# Patient Record
Sex: FEMALE | Race: BLACK OR AFRICAN AMERICAN | NOT HISPANIC OR LATINO
[De-identification: names, ages, dates, MRNs, and addresses within clinical notes are randomized per-mention and may not be internally consistent; named-entity substitution may affect disease eponyms.]

---

## 2017-02-27 ENCOUNTER — APPOINTMENT (OUTPATIENT)
Dept: ANTEPARTUM | Facility: CLINIC | Age: 37
End: 2017-02-27

## 2017-02-27 ENCOUNTER — ASOB RESULT (OUTPATIENT)
Age: 37
End: 2017-02-27

## 2017-04-10 ENCOUNTER — ASOB RESULT (OUTPATIENT)
Age: 37
End: 2017-04-10

## 2017-04-10 ENCOUNTER — APPOINTMENT (OUTPATIENT)
Dept: ANTEPARTUM | Facility: CLINIC | Age: 37
End: 2017-04-10

## 2017-08-05 ENCOUNTER — OUTPATIENT (OUTPATIENT)
Dept: OUTPATIENT SERVICES | Facility: HOSPITAL | Age: 37
LOS: 1 days | End: 2017-08-05

## 2017-08-05 DIAGNOSIS — O26.899 OTHER SPECIFIED PREGNANCY RELATED CONDITIONS, UNSPECIFIED TRIMESTER: ICD-10-CM

## 2017-08-05 DIAGNOSIS — Z3A.00 WEEKS OF GESTATION OF PREGNANCY NOT SPECIFIED: ICD-10-CM

## 2017-09-01 ENCOUNTER — OUTPATIENT (OUTPATIENT)
Dept: OUTPATIENT SERVICES | Facility: HOSPITAL | Age: 37
LOS: 1 days | End: 2017-09-01

## 2017-09-01 DIAGNOSIS — Z3A.00 WEEKS OF GESTATION OF PREGNANCY NOT SPECIFIED: ICD-10-CM

## 2017-09-01 DIAGNOSIS — O26.899 OTHER SPECIFIED PREGNANCY RELATED CONDITIONS, UNSPECIFIED TRIMESTER: ICD-10-CM

## 2017-09-02 ENCOUNTER — INPATIENT (INPATIENT)
Facility: HOSPITAL | Age: 37
LOS: 1 days | Discharge: HOME | End: 2017-09-04
Attending: OBSTETRICS & GYNECOLOGY | Admitting: OBSTETRICS & GYNECOLOGY

## 2017-09-02 ENCOUNTER — TRANSCRIPTION ENCOUNTER (OUTPATIENT)
Age: 37
End: 2017-09-02

## 2017-09-02 VITALS — DIASTOLIC BLOOD PRESSURE: 63 MMHG | SYSTOLIC BLOOD PRESSURE: 130 MMHG | HEART RATE: 101 BPM | TEMPERATURE: 98 F

## 2017-09-02 DIAGNOSIS — O26.899 OTHER SPECIFIED PREGNANCY RELATED CONDITIONS, UNSPECIFIED TRIMESTER: ICD-10-CM

## 2017-09-02 DIAGNOSIS — Z3A.00 WEEKS OF GESTATION OF PREGNANCY NOT SPECIFIED: ICD-10-CM

## 2017-09-02 LAB
BASOPHILS # BLD AUTO: 0.03 K/UL — SIGNIFICANT CHANGE UP (ref 0–0.2)
BASOPHILS NFR BLD AUTO: 0.2 % — SIGNIFICANT CHANGE UP (ref 0–2)
BLD GP AB SCN SERPL QL: NEGATIVE — SIGNIFICANT CHANGE UP
EOSINOPHIL # BLD AUTO: 0.14 K/UL — SIGNIFICANT CHANGE UP (ref 0–0.5)
EOSINOPHIL NFR BLD AUTO: 1 % — SIGNIFICANT CHANGE UP (ref 0–6)
HCT VFR BLD CALC: 41.6 % — SIGNIFICANT CHANGE UP (ref 34.5–45)
HGB BLD-MCNC: 14.4 G/DL — SIGNIFICANT CHANGE UP (ref 11.5–15.5)
IMM GRANULOCYTES # BLD AUTO: 0.07 # — SIGNIFICANT CHANGE UP
IMM GRANULOCYTES NFR BLD AUTO: 0.5 % — SIGNIFICANT CHANGE UP (ref 0–1.5)
LYMPHOCYTES # BLD AUTO: 24.2 % — SIGNIFICANT CHANGE UP (ref 13–44)
LYMPHOCYTES # BLD AUTO: 3.27 K/UL — SIGNIFICANT CHANGE UP (ref 1–3.3)
MCHC RBC-ENTMCNC: 30.8 PG — SIGNIFICANT CHANGE UP (ref 27–34)
MCHC RBC-ENTMCNC: 34.6 % — SIGNIFICANT CHANGE UP (ref 32–36)
MCV RBC AUTO: 88.9 FL — SIGNIFICANT CHANGE UP (ref 80–100)
MONOCYTES # BLD AUTO: 0.95 K/UL — HIGH (ref 0–0.9)
MONOCYTES NFR BLD AUTO: 7 % — SIGNIFICANT CHANGE UP (ref 2–14)
NEUTROPHILS # BLD AUTO: 9.03 K/UL — HIGH (ref 1.8–7.4)
NEUTROPHILS NFR BLD AUTO: 67.1 % — SIGNIFICANT CHANGE UP (ref 43–77)
NRBC # FLD: 0 — SIGNIFICANT CHANGE UP
PLATELET # BLD AUTO: 249 K/UL — SIGNIFICANT CHANGE UP (ref 150–400)
PMV BLD: 11.8 FL — SIGNIFICANT CHANGE UP (ref 7–13)
RBC # BLD: 4.68 M/UL — SIGNIFICANT CHANGE UP (ref 3.8–5.2)
RBC # FLD: 13.8 % — SIGNIFICANT CHANGE UP (ref 10.3–14.5)
RH IG SCN BLD-IMP: POSITIVE — SIGNIFICANT CHANGE UP
RH IG SCN BLD-IMP: POSITIVE — SIGNIFICANT CHANGE UP
T PALLIDUM AB TITR SER: NEGATIVE — SIGNIFICANT CHANGE UP
WBC # BLD: 13.49 K/UL — HIGH (ref 3.8–10.5)
WBC # FLD AUTO: 13.49 K/UL — HIGH (ref 3.8–10.5)

## 2017-09-02 RX ORDER — DIPHENHYDRAMINE HCL 50 MG
25 CAPSULE ORAL EVERY 6 HOURS
Qty: 0 | Refills: 0 | Status: DISCONTINUED | OUTPATIENT
Start: 2017-09-02 | End: 2017-09-04

## 2017-09-02 RX ORDER — TETANUS TOXOID, REDUCED DIPHTHERIA TOXOID AND ACELLULAR PERTUSSIS VACCINE, ADSORBED 5; 2.5; 8; 8; 2.5 [IU]/.5ML; [IU]/.5ML; UG/.5ML; UG/.5ML; UG/.5ML
0.5 SUSPENSION INTRAMUSCULAR ONCE
Qty: 0 | Refills: 0 | Status: DISCONTINUED | OUTPATIENT
Start: 2017-09-02 | End: 2017-09-04

## 2017-09-02 RX ORDER — IBUPROFEN 200 MG
600 TABLET ORAL EVERY 6 HOURS
Qty: 0 | Refills: 0 | Status: DISCONTINUED | OUTPATIENT
Start: 2017-09-02 | End: 2017-09-04

## 2017-09-02 RX ORDER — SIMETHICONE 80 MG/1
80 TABLET, CHEWABLE ORAL EVERY 6 HOURS
Qty: 0 | Refills: 0 | Status: DISCONTINUED | OUTPATIENT
Start: 2017-09-02 | End: 2017-09-04

## 2017-09-02 RX ORDER — SODIUM CHLORIDE 9 MG/ML
3 INJECTION INTRAMUSCULAR; INTRAVENOUS; SUBCUTANEOUS EVERY 8 HOURS
Qty: 0 | Refills: 0 | Status: DISCONTINUED | OUTPATIENT
Start: 2017-09-02 | End: 2017-09-02

## 2017-09-02 RX ORDER — AER TRAVELER 0.5 G/1
1 SOLUTION RECTAL; TOPICAL EVERY 4 HOURS
Qty: 0 | Refills: 0 | Status: DISCONTINUED | OUTPATIENT
Start: 2017-09-02 | End: 2017-09-02

## 2017-09-02 RX ORDER — PRAMOXINE HYDROCHLORIDE 150 MG/15G
1 AEROSOL, FOAM RECTAL EVERY 4 HOURS
Qty: 0 | Refills: 0 | Status: DISCONTINUED | OUTPATIENT
Start: 2017-09-02 | End: 2017-09-02

## 2017-09-02 RX ORDER — OXYTOCIN 10 UNIT/ML
333.33 VIAL (ML) INJECTION
Qty: 20 | Refills: 0 | Status: COMPLETED | OUTPATIENT
Start: 2017-09-02

## 2017-09-02 RX ORDER — SODIUM CHLORIDE 9 MG/ML
1000 INJECTION, SOLUTION INTRAVENOUS ONCE
Qty: 0 | Refills: 0 | Status: DISCONTINUED | OUTPATIENT
Start: 2017-09-02 | End: 2017-09-02

## 2017-09-02 RX ORDER — LANOLIN
1 OINTMENT (GRAM) TOPICAL EVERY 6 HOURS
Qty: 0 | Refills: 0 | Status: DISCONTINUED | OUTPATIENT
Start: 2017-09-02 | End: 2017-09-04

## 2017-09-02 RX ORDER — GLYCERIN ADULT
1 SUPPOSITORY, RECTAL RECTAL AT BEDTIME
Qty: 0 | Refills: 0 | Status: DISCONTINUED | OUTPATIENT
Start: 2017-09-02 | End: 2017-09-04

## 2017-09-02 RX ORDER — ACETAMINOPHEN 500 MG
975 TABLET ORAL EVERY 6 HOURS
Qty: 0 | Refills: 0 | Status: COMPLETED | OUTPATIENT
Start: 2017-09-02 | End: 2018-08-01

## 2017-09-02 RX ORDER — DOCUSATE SODIUM 100 MG
100 CAPSULE ORAL
Qty: 0 | Refills: 0 | Status: DISCONTINUED | OUTPATIENT
Start: 2017-09-02 | End: 2017-09-04

## 2017-09-02 RX ORDER — OXYCODONE HYDROCHLORIDE 5 MG/1
5 TABLET ORAL
Qty: 0 | Refills: 0 | Status: DISCONTINUED | OUTPATIENT
Start: 2017-09-02 | End: 2017-09-04

## 2017-09-02 RX ORDER — MAGNESIUM HYDROXIDE 400 MG/1
30 TABLET, CHEWABLE ORAL
Qty: 0 | Refills: 0 | Status: DISCONTINUED | OUTPATIENT
Start: 2017-09-02 | End: 2017-09-04

## 2017-09-02 RX ORDER — OXYTOCIN 10 UNIT/ML
333.33 VIAL (ML) INJECTION
Qty: 20 | Refills: 0 | Status: COMPLETED | OUTPATIENT
Start: 2017-09-02 | End: 2017-09-02

## 2017-09-02 RX ORDER — DIBUCAINE 1 %
1 OINTMENT (GRAM) RECTAL EVERY 4 HOURS
Qty: 0 | Refills: 0 | Status: DISCONTINUED | OUTPATIENT
Start: 2017-09-02 | End: 2017-09-04

## 2017-09-02 RX ORDER — IBUPROFEN 200 MG
600 TABLET ORAL EVERY 6 HOURS
Qty: 0 | Refills: 0 | Status: COMPLETED | OUTPATIENT
Start: 2017-09-02 | End: 2018-08-01

## 2017-09-02 RX ORDER — ACETAMINOPHEN 500 MG
975 TABLET ORAL EVERY 6 HOURS
Qty: 0 | Refills: 0 | Status: DISCONTINUED | OUTPATIENT
Start: 2017-09-02 | End: 2017-09-04

## 2017-09-02 RX ORDER — SODIUM CHLORIDE 9 MG/ML
3 INJECTION INTRAMUSCULAR; INTRAVENOUS; SUBCUTANEOUS EVERY 8 HOURS
Qty: 0 | Refills: 0 | Status: DISCONTINUED | OUTPATIENT
Start: 2017-09-02 | End: 2017-09-04

## 2017-09-02 RX ORDER — HYDROCORTISONE 1 %
1 OINTMENT (GRAM) TOPICAL EVERY 4 HOURS
Qty: 0 | Refills: 0 | Status: DISCONTINUED | OUTPATIENT
Start: 2017-09-02 | End: 2017-09-03

## 2017-09-02 RX ORDER — SODIUM CHLORIDE 9 MG/ML
1000 INJECTION, SOLUTION INTRAVENOUS
Qty: 0 | Refills: 0 | Status: DISCONTINUED | OUTPATIENT
Start: 2017-09-02 | End: 2017-09-02

## 2017-09-02 RX ORDER — AER TRAVELER 0.5 G/1
1 SOLUTION RECTAL; TOPICAL EVERY 4 HOURS
Qty: 0 | Refills: 0 | Status: DISCONTINUED | OUTPATIENT
Start: 2017-09-02 | End: 2017-09-04

## 2017-09-02 RX ORDER — OXYTOCIN 10 UNIT/ML
41.67 VIAL (ML) INJECTION
Qty: 20 | Refills: 0 | Status: DISCONTINUED | OUTPATIENT
Start: 2017-09-02 | End: 2017-09-02

## 2017-09-02 RX ORDER — OXYTOCIN 10 UNIT/ML
41.67 VIAL (ML) INJECTION
Qty: 20 | Refills: 0 | Status: DISCONTINUED | OUTPATIENT
Start: 2017-09-02 | End: 2017-09-03

## 2017-09-02 RX ORDER — KETOROLAC TROMETHAMINE 30 MG/ML
30 SYRINGE (ML) INJECTION ONCE
Qty: 0 | Refills: 0 | Status: DISCONTINUED | OUTPATIENT
Start: 2017-09-02 | End: 2017-09-02

## 2017-09-02 RX ORDER — HYDROCORTISONE 1 %
1 OINTMENT (GRAM) TOPICAL EVERY 4 HOURS
Qty: 0 | Refills: 0 | Status: DISCONTINUED | OUTPATIENT
Start: 2017-09-02 | End: 2017-09-02

## 2017-09-02 RX ORDER — DIBUCAINE 1 %
1 OINTMENT (GRAM) RECTAL EVERY 4 HOURS
Qty: 0 | Refills: 0 | Status: DISCONTINUED | OUTPATIENT
Start: 2017-09-02 | End: 2017-09-02

## 2017-09-02 RX ORDER — PRAMOXINE HYDROCHLORIDE 150 MG/15G
1 AEROSOL, FOAM RECTAL EVERY 4 HOURS
Qty: 0 | Refills: 0 | Status: DISCONTINUED | OUTPATIENT
Start: 2017-09-02 | End: 2017-09-03

## 2017-09-02 RX ADMIN — Medication 600 MILLIGRAM(S): at 21:46

## 2017-09-02 RX ADMIN — Medication 1 TABLET(S): at 13:38

## 2017-09-02 RX ADMIN — SODIUM CHLORIDE 3 MILLILITER(S): 9 INJECTION INTRAMUSCULAR; INTRAVENOUS; SUBCUTANEOUS at 22:09

## 2017-09-02 RX ADMIN — Medication 1000 MILLIUNIT(S)/MIN: at 10:28

## 2017-09-02 RX ADMIN — SODIUM CHLORIDE 3 MILLILITER(S): 9 INJECTION INTRAMUSCULAR; INTRAVENOUS; SUBCUTANEOUS at 14:42

## 2017-09-02 RX ADMIN — Medication 30 MILLIGRAM(S): at 07:35

## 2017-09-02 RX ADMIN — Medication 125 MILLIUNIT(S)/MIN: at 10:27

## 2017-09-02 RX ADMIN — Medication 30 MILLIGRAM(S): at 08:05

## 2017-09-02 RX ADMIN — SODIUM CHLORIDE 125 MILLILITER(S): 9 INJECTION, SOLUTION INTRAVENOUS at 10:25

## 2017-09-02 RX ADMIN — Medication 600 MILLIGRAM(S): at 22:20

## 2017-09-02 NOTE — DISCHARGE NOTE OB - PATIENT PORTAL LINK FT
“You can access the [employer] Patient Portal, offered by [facility], by registering with the following website: [email or web address]”

## 2017-09-02 NOTE — DISCHARGE NOTE OB - CARE PROVIDER_API CALL
Titus Muller), Obstetrics and Gynecology  [address] [place] [address]  Phone: [phone]  Fax: [phone]

## 2017-09-02 NOTE — DISCHARGE NOTE OB - CARE PLAN
Principal Discharge DX:	Delivery normal  Goal:	Recovery  Instructions for follow-up, activity and diet:	Regular diet  Secondary Diagnosis:	Precipitous delivery

## 2017-09-03 RX ORDER — HYDROCORTISONE 1 %
1 OINTMENT (GRAM) TOPICAL EVERY 4 HOURS
Qty: 0 | Refills: 0 | Status: DISCONTINUED | OUTPATIENT
Start: 2017-09-03 | End: 2017-09-04

## 2017-09-03 RX ORDER — PRAMOXINE HYDROCHLORIDE 150 MG/15G
1 AEROSOL, FOAM RECTAL EVERY 4 HOURS
Qty: 0 | Refills: 0 | Status: DISCONTINUED | OUTPATIENT
Start: 2017-09-03 | End: 2017-09-04

## 2017-09-03 RX ADMIN — Medication 600 MILLIGRAM(S): at 18:15

## 2017-09-03 RX ADMIN — Medication 600 MILLIGRAM(S): at 19:15

## 2017-09-03 RX ADMIN — SODIUM CHLORIDE 3 MILLILITER(S): 9 INJECTION INTRAMUSCULAR; INTRAVENOUS; SUBCUTANEOUS at 07:09

## 2017-09-03 RX ADMIN — Medication 1 TABLET(S): at 12:08

## 2017-09-03 NOTE — PROGRESS NOTE ADULT - SUBJECTIVE AND OBJECTIVE BOX
Patient assessed at 1100.    Subjective  Pain: Patient denies any pain at the time of assessment. Pain being managed well by pain management protocol  Complaints:  None  Milestones: Alert and orientedx3. Out of bed ambulating. Voiding. Tolerating a regular diet.  Infant feeding: Breastfeeding                          Feeding related issues or concerns: none    Objective   Vital Signs:  ICU Vital Signs Last 24 Hrs  T(C): 36.6 (03 Sep 2017 05:24), Max: 37.3 (02 Sep 2017 18:00)  T(F): 97.9 (03 Sep 2017 05:24), Max: 99.1 (02 Sep 2017 18:00)  HR: 109 (03 Sep 2017 05:24) (87 - 109)  BP: 113/64 (03 Sep 2017 05:24) (113/64 - 120/70)  BP(mean): --  ABP: --  ABP(mean): --  RR: 18 (03 Sep 2017 05:24) (16 - 18)  SpO2: 100% (03 Sep 2017 05:24) (98% - 100%)    Labs:                        14.4   13.49 )-----------( 249      ( 02 Sep 2017 07:16 )             41.6     Blood Type: Opositive  Rubella: Immune  RPR: nonreactive    Assessment  36y/o . Day #1  postpartum  on 17 @ 0725. Precipitous delivery  Past medical history significant for non-hodgkins lymphoma  Current Issues: None   Breasts: soft, nontender  Nipples: intact  Abdomen: Soft, nontender, nondistended. Fundus firm. Positive bowel sounds  Vagina: Lochia light rubra  Perineum:  WNL, no edema noted  Laceration/episiotomy site: periurethral laceration and 1degree laceration WNL, no edema noted  Lower extremities: No edema noted bilaterally of lower extremities. Nontender calves.  Negative Mikki's sign. Positive pedal pulses.  Other relevant physical exam findings:      Plan  Plan: Increase ambulation, analgesia PRN and pain medication protocol standing oxycodone, ibuprofen and acetaminophen.  Diet: Continue regular diet  Labs: None    Continue routine postpartum care.

## 2017-09-03 NOTE — PROGRESS NOTE ADULT - SUBJECTIVE AND OBJECTIVE BOX
Post-partum Note,   She is a  37y woman who is now post-partum day: 1    Subjective:  Patient seen and examined at bedside. She feels well and is without complaints. Voiding/Ambulating without difficulty. Patient is tolerating regular diet. She reports normal postpartum bleeding. Bottle/Breast feeding.     Vital Signs Last 24 Hrs  T(C): 36.6 (03 Sep 2017 05:24), Max: 37.6 (02 Sep 2017 11:18)  T(F): 97.9 (03 Sep 2017 05:24), Max: 99.7 (02 Sep 2017 11:18)  HR: 109 (03 Sep 2017 05:24) (76 - 109)  BP: 113/64 (03 Sep 2017 05:24) (113/64 - 132/77)  BP(mean): --  RR: 18 (03 Sep 2017 05:24) (16 - 18)  SpO2: 100% (03 Sep 2017 05:24) (98% - 100%)    Physical Exam:   Gen: NAD  Abdomen: Soft, nontender, no distension , firm uterine fundus at umbilicus.  Pelvic: Normal lochia noted  Ext: No calf tenderness    LABS:                        14.4   13.49 )-----------( 249      ( 02 Sep 2017 07:16 )             41.6       MEDICATIONS  (STANDING):  sodium chloride 0.9% lock flush 3 milliLiter(s) IV Push every 8 hours  diphtheria/tetanus/pertussis (acellular) Vaccine (ADAcel) 0.5 milliLiter(s) IntraMuscular once  prenatal multivitamin 1 Tablet(s) Oral daily  oxyCODONE    IR 5 milliGRAM(s) Oral every 3 hours  acetaminophen   Tablet. 975 milliGRAM(s) Oral every 6 hours  ibuprofen  Tablet 600 milliGRAM(s) Oral every 6 hours    MEDICATIONS  (PRN):  dibucaine 1% Ointment 1 Application(s) Topical every 4 hours PRN Perineal Discomfort  lanolin Ointment 1 Application(s) Topical every 6 hours PRN Sore Nipples  witch hazel Pads 1 Application(s) Topical every 4 hours PRN Perineal Discomfort  simethicone 80 milliGRAM(s) Chew every 6 hours PRN Gas  diphenhydrAMINE   Capsule 25 milliGRAM(s) Oral every 6 hours PRN Itching  glycerin Suppository - Adult 1 Suppository(s) Rectal at bedtime PRN Constipation  magnesium hydroxide Suspension 30 milliLiter(s) Oral two times a day PRN Constipation  docusate sodium 100 milliGRAM(s) Oral two times a day PRN Stool Softening  pramoxine 1%/zinc 5% Cream 1 Application(s) Topical every 4 hours PRN perineal discomfort  hydrocortisone 1% Cream 1 Application(s) Topical every 4 hours PRN perineal discomfort        Assessment and Plan  s/p , PPD#1  stable and afebrile   Encourage ambulation  PP & PPD Instructions reviewed.  Continue postpartum care- IN home tomorrow

## 2017-09-03 NOTE — PROGRESS NOTE ADULT - ASSESSMENT
Assessment and Plan  s/p , PPD#1  stable and afebrile   Encourage ambulation  PP & PPD Instructions reviewed.  Continue postpartum care  DC home tomorrow, follow up in 6 weeks for post partum visit.

## 2017-09-04 VITALS
SYSTOLIC BLOOD PRESSURE: 135 MMHG | RESPIRATION RATE: 18 BRPM | OXYGEN SATURATION: 100 % | HEART RATE: 85 BPM | DIASTOLIC BLOOD PRESSURE: 87 MMHG | TEMPERATURE: 98 F

## 2017-09-04 RX ORDER — IBUPROFEN 200 MG
1 TABLET ORAL
Qty: 0 | Refills: 0 | COMMUNITY
Start: 2017-09-04

## 2017-09-04 RX ORDER — ACETAMINOPHEN 500 MG
3 TABLET ORAL
Qty: 0 | Refills: 0 | COMMUNITY
Start: 2017-09-04

## 2017-09-04 RX ADMIN — Medication 1 TABLET(S): at 14:04

## 2017-09-04 RX ADMIN — Medication 600 MILLIGRAM(S): at 14:05

## 2017-09-04 RX ADMIN — Medication 600 MILLIGRAM(S): at 14:04

## 2017-09-04 NOTE — PROGRESS NOTE ADULT - SUBJECTIVE AND OBJECTIVE BOX
Patient assessed due to 0848.  Subjective  Pain: Patient denies any pain at the time of assessment. Pain being managed well by pain management protocol  Complaints:  None  Milestones: Alert and orientedx3. Out of bed ambulating. Voiding. Tolerating a regular diet.  Infant feeding: Breastfeeding                           Feeding related issues or concerns: None    Objective   Vital Signs:  Vital Signs Last 24 Hrs  T(C): 36.4 (04 Sep 2017 05:07), Max: 36.6 (03 Sep 2017 18:56)  T(F): 97.6 (04 Sep 2017 05:07), Max: 97.8 (03 Sep 2017 18:56)  HR: 85 (04 Sep 2017 05:07) (85 - 105)  BP: 135/87 (04 Sep 2017 05:07) (124/77 - 135/87)  BP(mean): --  RR: 18 (04 Sep 2017 05:07) (18 - 18)  SpO2: 100% (04 Sep 2017 05:07) (100% - 100%)    Labs:              14.4  13.49 )------------( 249                ( 02 Sep 2017 07:16)               41.6    Blood Type: Bpositive  Rubella: Immune  RPR: nonreactive    Assessment  31y/o . Day #2 postpartum . Precipitous delivery  Past medical history significant for none  Current Issues: None  Breasts: soft, nontender  Nipples: intact  Abdomen: Soft, nontender, nondistended. Fundus firm. Positive bowel sounds.  Vagina: Lochia light rubra  Perineum:  WNL, no edema noted  Laceration/episiotomy site: periurethral laceration, 1degree laceration  Lower extremities: No edema noted bilaterally of lower extremities. Nontender calves.  Negative Mikki's sign. Positive pedal pulses.  Other relevant physical exam findings: None      Plan  Plan: Increase ambulation, analgesia PRN and pain medication protocol standing oxycodone, ibuprofen and acetaminophen.  Diet: Continue regular diet  Labs: None    Continue routine postpartum care. Patient to be discharge to home today. Discussed discharge planning.

## 2019-03-04 ENCOUNTER — APPOINTMENT (OUTPATIENT)
Dept: SURGERY | Facility: CLINIC | Age: 39
End: 2019-03-04
Payer: COMMERCIAL

## 2019-03-04 VITALS
DIASTOLIC BLOOD PRESSURE: 78 MMHG | OXYGEN SATURATION: 100 % | TEMPERATURE: 98.01 F | HEART RATE: 89 BPM | SYSTOLIC BLOOD PRESSURE: 109 MMHG | WEIGHT: 215 LBS | BODY MASS INDEX: 39.56 KG/M2 | HEIGHT: 62 IN

## 2019-03-04 PROCEDURE — 99243 OFF/OP CNSLTJ NEW/EST LOW 30: CPT

## 2019-04-11 ENCOUNTER — OUTPATIENT (OUTPATIENT)
Dept: OUTPATIENT SERVICES | Facility: HOSPITAL | Age: 39
LOS: 1 days | End: 2019-04-11
Payer: COMMERCIAL

## 2019-04-11 VITALS
HEIGHT: 62 IN | DIASTOLIC BLOOD PRESSURE: 69 MMHG | TEMPERATURE: 98 F | WEIGHT: 225.09 LBS | RESPIRATION RATE: 16 BRPM | SYSTOLIC BLOOD PRESSURE: 108 MMHG | HEART RATE: 74 BPM | OXYGEN SATURATION: 100 %

## 2019-04-11 DIAGNOSIS — K81.9 CHOLECYSTITIS, UNSPECIFIED: ICD-10-CM

## 2019-04-11 DIAGNOSIS — Z01.818 ENCOUNTER FOR OTHER PREPROCEDURAL EXAMINATION: ICD-10-CM

## 2019-04-11 DIAGNOSIS — Z45.2 ENCOUNTER FOR ADJUSTMENT AND MANAGEMENT OF VASCULAR ACCESS DEVICE: Chronic | ICD-10-CM

## 2019-04-11 LAB — HCG SERPL-ACNC: <1 MIU/ML — SIGNIFICANT CHANGE UP

## 2019-04-11 PROCEDURE — 36415 COLL VENOUS BLD VENIPUNCTURE: CPT

## 2019-04-11 PROCEDURE — 86900 BLOOD TYPING SEROLOGIC ABO: CPT

## 2019-04-11 PROCEDURE — 84702 CHORIONIC GONADOTROPIN TEST: CPT

## 2019-04-11 PROCEDURE — G0463: CPT

## 2019-04-11 PROCEDURE — 86850 RBC ANTIBODY SCREEN: CPT

## 2019-04-11 PROCEDURE — 86901 BLOOD TYPING SEROLOGIC RH(D): CPT

## 2019-04-11 RX ORDER — SODIUM CHLORIDE 9 MG/ML
3 INJECTION INTRAMUSCULAR; INTRAVENOUS; SUBCUTANEOUS EVERY 8 HOURS
Qty: 0 | Refills: 0 | Status: DISCONTINUED | OUTPATIENT
Start: 2019-04-17 | End: 2019-04-25

## 2019-04-11 NOTE — H&P PST ADULT - NSICDXPROBLEM_GEN_ALL_CORE_FT
PROBLEM DIAGNOSES  Problem: Cholecystitis  Assessment and Plan: Laparoscopic cholecystectomy, with intraoperative cholangiogram, possible open

## 2019-04-11 NOTE — H&P PST ADULT - NSANTHOSAYNRD_GEN_A_CORE
No. GUTIERREZ screening performed.  STOP BANG Legend: 0-2 = LOW Risk; 3-4 = INTERMEDIATE Risk; 5-8 = HIGH Risk

## 2019-04-11 NOTE — H&P PST ADULT - ASSESSMENT
38 y/o female with h/o Non Hodgkin's lymphoma treated and cured, and cholecystitis diagnosed in 2018 is here for pre op evaluation for Laparoscopic cholecystectomy with intraoperative cholangiogram on 4/17/19 38 y/o female with h/o Non Hodgkin's lymphoma treated and cured, and cholecystitis diagnosed in 2018 is here for pre op evaluation for Laparoscopic cholecystectomy with intraoperative cholangiogram possible open on 4/17/19.

## 2019-04-11 NOTE — H&P PST ADULT - HISTORY OF PRESENT ILLNESS
38 y/o female presented with c/o epigastric pain after she gave birth to her son 19 months ago. Pt also c/o nausea, but no vomiting. She had an EGD done in March and found she had lot of acid. An US abdomen revealed she has gall stones. She is scheduled for Laparoscopic cholecystectomy, with intraoperative cholangiogram possible open. 40 y/o female presented with c/o epigastric pain after she gave birth to her son 19 months ago. Pt also c/o nausea, but no vomiting. She had an EGD done in March and found she had lot of acid as stated by patient. An US abdomen revealed she has gall stones. She is scheduled for Laparoscopic cholecystectomy, with intraoperative cholangiogram possible open.

## 2019-04-16 ENCOUNTER — TRANSCRIPTION ENCOUNTER (OUTPATIENT)
Age: 39
End: 2019-04-16

## 2019-04-17 ENCOUNTER — OUTPATIENT (OUTPATIENT)
Dept: OUTPATIENT SERVICES | Facility: HOSPITAL | Age: 39
LOS: 1 days | End: 2019-04-17
Payer: COMMERCIAL

## 2019-04-17 ENCOUNTER — RESULT REVIEW (OUTPATIENT)
Age: 39
End: 2019-04-17

## 2019-04-17 ENCOUNTER — APPOINTMENT (OUTPATIENT)
Dept: SURGERY | Facility: HOSPITAL | Age: 39
End: 2019-04-17
Payer: COMMERCIAL

## 2019-04-17 VITALS
HEART RATE: 92 BPM | OXYGEN SATURATION: 98 % | WEIGHT: 225.09 LBS | RESPIRATION RATE: 14 BRPM | SYSTOLIC BLOOD PRESSURE: 126 MMHG | HEIGHT: 62 IN | TEMPERATURE: 98 F | DIASTOLIC BLOOD PRESSURE: 72 MMHG

## 2019-04-17 VITALS
DIASTOLIC BLOOD PRESSURE: 64 MMHG | RESPIRATION RATE: 16 BRPM | OXYGEN SATURATION: 100 % | TEMPERATURE: 97 F | SYSTOLIC BLOOD PRESSURE: 111 MMHG | HEART RATE: 84 BPM

## 2019-04-17 DIAGNOSIS — K81.9 CHOLECYSTITIS, UNSPECIFIED: ICD-10-CM

## 2019-04-17 DIAGNOSIS — Z01.818 ENCOUNTER FOR OTHER PREPROCEDURAL EXAMINATION: ICD-10-CM

## 2019-04-17 DIAGNOSIS — Z45.2 ENCOUNTER FOR ADJUSTMENT AND MANAGEMENT OF VASCULAR ACCESS DEVICE: Chronic | ICD-10-CM

## 2019-04-17 PROCEDURE — 47563 LAPARO CHOLECYSTECTOMY/GRAPH: CPT

## 2019-04-17 PROCEDURE — 36415 COLL VENOUS BLD VENIPUNCTURE: CPT

## 2019-04-17 PROCEDURE — 88304 TISSUE EXAM BY PATHOLOGIST: CPT

## 2019-04-17 PROCEDURE — 86900 BLOOD TYPING SEROLOGIC ABO: CPT

## 2019-04-17 PROCEDURE — 47563 LAPARO CHOLECYSTECTOMY/GRAPH: CPT | Mod: AS

## 2019-04-17 PROCEDURE — 86850 RBC ANTIBODY SCREEN: CPT

## 2019-04-17 PROCEDURE — C1889: CPT

## 2019-04-17 PROCEDURE — 86901 BLOOD TYPING SEROLOGIC RH(D): CPT

## 2019-04-17 PROCEDURE — 76000 FLUOROSCOPY <1 HR PHYS/QHP: CPT

## 2019-04-17 PROCEDURE — 88304 TISSUE EXAM BY PATHOLOGIST: CPT | Mod: 26

## 2019-04-17 RX ORDER — OXYCODONE AND ACETAMINOPHEN 5; 325 MG/1; MG/1
1 TABLET ORAL EVERY 4 HOURS
Qty: 0 | Refills: 0 | Status: DISCONTINUED | OUTPATIENT
Start: 2019-04-17 | End: 2019-04-17

## 2019-04-17 RX ORDER — FENTANYL CITRATE 50 UG/ML
25 INJECTION INTRAVENOUS
Qty: 0 | Refills: 0 | Status: DISCONTINUED | OUTPATIENT
Start: 2019-04-17 | End: 2019-04-17

## 2019-04-17 RX ORDER — ONDANSETRON 8 MG/1
4 TABLET, FILM COATED ORAL EVERY 6 HOURS
Qty: 0 | Refills: 0 | Status: DISCONTINUED | OUTPATIENT
Start: 2019-04-17 | End: 2019-04-25

## 2019-04-17 RX ORDER — SODIUM CHLORIDE 9 MG/ML
1000 INJECTION, SOLUTION INTRAVENOUS
Qty: 0 | Refills: 0 | Status: DISCONTINUED | OUTPATIENT
Start: 2019-04-17 | End: 2019-04-17

## 2019-04-17 RX ORDER — ACETAMINOPHEN 500 MG
1000 TABLET ORAL ONCE
Qty: 0 | Refills: 0 | Status: COMPLETED | OUTPATIENT
Start: 2019-04-17 | End: 2019-04-17

## 2019-04-17 RX ADMIN — Medication 400 MILLIGRAM(S): at 11:18

## 2019-04-17 RX ADMIN — Medication 1000 MILLIGRAM(S): at 11:48

## 2019-04-17 NOTE — BRIEF OPERATIVE NOTE - NSICDXBRIEFPROCEDURE_GEN_ALL_CORE_FT
PROCEDURES:  Laparoscopic cholecystectomy w cholangiography 17-Apr-2019 09:58:36  Josemanuel Mcwilliams

## 2019-04-17 NOTE — ASU DISCHARGE PLAN (ADULT/PEDIATRIC) - CALL YOUR DOCTOR IF YOU HAVE ANY OF THE FOLLOWING:
Pain not relieved by Medications/Swelling that gets worse/Unable to urinate/Bleeding that does not stop/Wound/Surgical Site with redness, or foul smelling discharge or pus/Inability to tolerate liquids or foods/Nausea and vomiting that does not stop/Fever greater than (need to indicate Fahrenheit or Celsius)

## 2019-04-19 LAB — SURGICAL PATHOLOGY STUDY: SIGNIFICANT CHANGE UP

## 2019-04-22 PROBLEM — C85.90 NON-HODGKIN LYMPHOMA, UNSPECIFIED, UNSPECIFIED SITE: Chronic | Status: ACTIVE | Noted: 2019-04-11

## 2019-04-22 PROBLEM — K81.9 CHOLECYSTITIS, UNSPECIFIED: Chronic | Status: ACTIVE | Noted: 2019-04-11

## 2019-04-25 ENCOUNTER — TRANSCRIPTION ENCOUNTER (OUTPATIENT)
Age: 39
End: 2019-04-25

## 2019-04-25 ENCOUNTER — APPOINTMENT (OUTPATIENT)
Dept: SURGERY | Facility: CLINIC | Age: 39
End: 2019-04-25
Payer: COMMERCIAL

## 2019-04-25 PROCEDURE — 99024 POSTOP FOLLOW-UP VISIT: CPT

## 2019-04-25 NOTE — HISTORY OF PRESENT ILLNESS
[de-identified] : 39 y.o F s/p laparoscopic cholecystectomy 04/17/19; path results c/w chronic calculous cholecystitis. \par Path results c/w chronic calculous cholecystitis. Patient without reported complaints. She is presently eating a regular diet,\par has a normal appetite, normal BM's; she denies having any abdominal pain, no nausea/vomiting , no fevers/chills. Patient has some \par acid reflux and follows up with GI, Dr. Live Vu, she recently had an upper endoscopy, prior to the surgery. She is taking Omeprazole\par at home.

## 2019-04-25 NOTE — REASON FOR VISIT
[Post Op: _________] : a [unfilled] post op visit [FreeTextEntry1] : s/p laparoscopic cholecystectomy 04/17/19

## 2019-04-25 NOTE — PLAN
[FreeTextEntry1] : patient is doing well, without reported complaints; \par surgical sites healing well, no infection\par path results discussed w the patient and copy of the report was given\par Warning signs, follow up, and restrictions were discussed with the patient.\par F/U PRN or if there are any problems\par

## 2020-05-18 NOTE — H&P PST ADULT - HEART RATE (BEATS/MIN)
[Menarche Age: ____] : age at menarche was [unfilled] [Menopause Age: ____] : age at menopause was [unfilled] [___] : Full Term: [unfilled] 73

## 2020-12-03 NOTE — ASU PATIENT PROFILE, ADULT - NS PRO ABUSE SCREEN SUSPICION NEGLECT YN
no Topical Clindamycin Pregnancy And Lactation Text: This medication is Pregnancy Category B and is considered safe during pregnancy. It is unknown if it is excreted in breast milk.

## 2021-08-16 DIAGNOSIS — Z01.818 ENCOUNTER FOR OTHER PREPROCEDURAL EXAMINATION: ICD-10-CM

## 2021-08-17 ENCOUNTER — INPATIENT (INPATIENT)
Facility: HOSPITAL | Age: 41
LOS: 1 days | Discharge: HOME | DRG: 758 | End: 2021-08-19
Attending: OBSTETRICS & GYNECOLOGY | Admitting: OBSTETRICS & GYNECOLOGY
Payer: COMMERCIAL

## 2021-08-17 ENCOUNTER — APPOINTMENT (OUTPATIENT)
Dept: DISASTER EMERGENCY | Facility: CLINIC | Age: 41
End: 2021-08-17

## 2021-08-17 ENCOUNTER — OUTPATIENT (OUTPATIENT)
Dept: OUTPATIENT SERVICES | Facility: HOSPITAL | Age: 41
LOS: 1 days | End: 2021-08-17
Payer: COMMERCIAL

## 2021-08-17 VITALS
TEMPERATURE: 101 F | WEIGHT: 229.06 LBS | DIASTOLIC BLOOD PRESSURE: 57 MMHG | SYSTOLIC BLOOD PRESSURE: 133 MMHG | OXYGEN SATURATION: 100 % | RESPIRATION RATE: 16 BRPM | HEART RATE: 101 BPM | HEIGHT: 62 IN

## 2021-08-17 VITALS
DIASTOLIC BLOOD PRESSURE: 85 MMHG | SYSTOLIC BLOOD PRESSURE: 137 MMHG | HEIGHT: 62 IN | RESPIRATION RATE: 18 BRPM | OXYGEN SATURATION: 100 % | TEMPERATURE: 101 F | HEART RATE: 112 BPM

## 2021-08-17 DIAGNOSIS — O26.30 RETAINED INTRAUTERINE CONTRACEPTIVE DEVICE IN PREGNANCY, UNSPECIFIED TRIMESTER: ICD-10-CM

## 2021-08-17 DIAGNOSIS — Z45.2 ENCOUNTER FOR ADJUSTMENT AND MANAGEMENT OF VASCULAR ACCESS DEVICE: Chronic | ICD-10-CM

## 2021-08-17 DIAGNOSIS — Z90.49 ACQUIRED ABSENCE OF OTHER SPECIFIED PARTS OF DIGESTIVE TRACT: Chronic | ICD-10-CM

## 2021-08-17 DIAGNOSIS — Z01.818 ENCOUNTER FOR OTHER PREPROCEDURAL EXAMINATION: ICD-10-CM

## 2021-08-17 DIAGNOSIS — N73.9 FEMALE PELVIC INFLAMMATORY DISEASE, UNSPECIFIED: ICD-10-CM

## 2021-08-17 LAB
ALBUMIN SERPL ELPH-MCNC: 2.8 G/DL — LOW (ref 3.3–5)
ALBUMIN SERPL ELPH-MCNC: 2.9 G/DL — LOW (ref 3.3–5)
ALP SERPL-CCNC: 106 U/L — SIGNIFICANT CHANGE UP (ref 40–120)
ALP SERPL-CCNC: 109 U/L — SIGNIFICANT CHANGE UP (ref 40–120)
ALT FLD-CCNC: 56 U/L — SIGNIFICANT CHANGE UP (ref 12–78)
ALT FLD-CCNC: 59 U/L — SIGNIFICANT CHANGE UP (ref 12–78)
ANION GAP SERPL CALC-SCNC: 4 MMOL/L — LOW (ref 5–17)
ANION GAP SERPL CALC-SCNC: 7 MMOL/L — SIGNIFICANT CHANGE UP (ref 5–17)
ANISOCYTOSIS BLD QL: SLIGHT — SIGNIFICANT CHANGE UP
APPEARANCE UR: CLEAR — SIGNIFICANT CHANGE UP
APPEARANCE UR: CLEAR — SIGNIFICANT CHANGE UP
APTT BLD: 26.8 SEC — LOW (ref 27.5–35.5)
AST SERPL-CCNC: 39 U/L — HIGH (ref 15–37)
AST SERPL-CCNC: 39 U/L — HIGH (ref 15–37)
BACTERIA # UR AUTO: ABNORMAL
BASOPHILS # BLD AUTO: 0.04 K/UL — SIGNIFICANT CHANGE UP (ref 0–0.2)
BASOPHILS NFR BLD AUTO: 0.2 % — SIGNIFICANT CHANGE UP (ref 0–2)
BILIRUB SERPL-MCNC: 0.7 MG/DL — SIGNIFICANT CHANGE UP (ref 0.2–1.2)
BILIRUB SERPL-MCNC: 0.7 MG/DL — SIGNIFICANT CHANGE UP (ref 0.2–1.2)
BILIRUB UR-MCNC: NEGATIVE — SIGNIFICANT CHANGE UP
BILIRUB UR-MCNC: NEGATIVE — SIGNIFICANT CHANGE UP
BLD GP AB SCN SERPL QL: SIGNIFICANT CHANGE UP
BUN SERPL-MCNC: 4 MG/DL — LOW (ref 7–23)
BUN SERPL-MCNC: 5 MG/DL — LOW (ref 7–23)
CALCIUM SERPL-MCNC: 8.7 MG/DL — SIGNIFICANT CHANGE UP (ref 8.5–10.1)
CALCIUM SERPL-MCNC: 8.8 MG/DL — SIGNIFICANT CHANGE UP (ref 8.5–10.1)
CHLORIDE SERPL-SCNC: 105 MMOL/L — SIGNIFICANT CHANGE UP (ref 96–108)
CHLORIDE SERPL-SCNC: 105 MMOL/L — SIGNIFICANT CHANGE UP (ref 96–108)
CO2 SERPL-SCNC: 26 MMOL/L — SIGNIFICANT CHANGE UP (ref 22–31)
CO2 SERPL-SCNC: 28 MMOL/L — SIGNIFICANT CHANGE UP (ref 22–31)
COLOR SPEC: YELLOW — SIGNIFICANT CHANGE UP
COLOR SPEC: YELLOW — SIGNIFICANT CHANGE UP
CREAT SERPL-MCNC: 0.49 MG/DL — LOW (ref 0.5–1.3)
CREAT SERPL-MCNC: 0.5 MG/DL — SIGNIFICANT CHANGE UP (ref 0.5–1.3)
DIFF PNL FLD: ABNORMAL
DIFF PNL FLD: ABNORMAL
EOSINOPHIL # BLD AUTO: 0.02 K/UL — SIGNIFICANT CHANGE UP (ref 0–0.5)
EOSINOPHIL NFR BLD AUTO: 0.1 % — SIGNIFICANT CHANGE UP (ref 0–6)
EPI CELLS # UR: SIGNIFICANT CHANGE UP
GIANT PLATELETS BLD QL SMEAR: PRESENT — SIGNIFICANT CHANGE UP
GLUCOSE SERPL-MCNC: 100 MG/DL — HIGH (ref 70–99)
GLUCOSE SERPL-MCNC: 110 MG/DL — HIGH (ref 70–99)
GLUCOSE UR QL: NEGATIVE — SIGNIFICANT CHANGE UP
GLUCOSE UR QL: NEGATIVE — SIGNIFICANT CHANGE UP
HCG SERPL-ACNC: <1 MIU/ML — SIGNIFICANT CHANGE UP
HCG SERPL-ACNC: <1 MIU/ML — SIGNIFICANT CHANGE UP
HCT VFR BLD CALC: 27.2 % — LOW (ref 34.5–45)
HCT VFR BLD CALC: 27.5 % — LOW (ref 34.5–45)
HGB BLD-MCNC: 8.8 G/DL — LOW (ref 11.5–15.5)
HGB BLD-MCNC: 8.9 G/DL — LOW (ref 11.5–15.5)
HYPOCHROMIA BLD QL: SLIGHT — SIGNIFICANT CHANGE UP
IMM GRANULOCYTES NFR BLD AUTO: 0.8 % — SIGNIFICANT CHANGE UP (ref 0–1.5)
INR BLD: 1.33 RATIO — HIGH (ref 0.88–1.16)
KETONES UR-MCNC: ABNORMAL
KETONES UR-MCNC: NEGATIVE — SIGNIFICANT CHANGE UP
LACTATE SERPL-SCNC: 1.1 MMOL/L — SIGNIFICANT CHANGE UP (ref 0.7–2)
LEUKOCYTE ESTERASE UR-ACNC: ABNORMAL
LEUKOCYTE ESTERASE UR-ACNC: ABNORMAL
LYMPHOCYTES # BLD AUTO: 1.37 K/UL — SIGNIFICANT CHANGE UP (ref 1–3.3)
LYMPHOCYTES # BLD AUTO: 7.3 % — LOW (ref 13–44)
MANUAL SMEAR VERIFICATION: SIGNIFICANT CHANGE UP
MCHC RBC-ENTMCNC: 27.3 PG — SIGNIFICANT CHANGE UP (ref 27–34)
MCHC RBC-ENTMCNC: 27.4 PG — SIGNIFICANT CHANGE UP (ref 27–34)
MCHC RBC-ENTMCNC: 32.4 GM/DL — SIGNIFICANT CHANGE UP (ref 32–36)
MCHC RBC-ENTMCNC: 32.4 GM/DL — SIGNIFICANT CHANGE UP (ref 32–36)
MCV RBC AUTO: 84.4 FL — SIGNIFICANT CHANGE UP (ref 80–100)
MCV RBC AUTO: 84.7 FL — SIGNIFICANT CHANGE UP (ref 80–100)
MICROCYTES BLD QL: SLIGHT — SIGNIFICANT CHANGE UP
MONOCYTES # BLD AUTO: 1.72 K/UL — HIGH (ref 0–0.9)
MONOCYTES NFR BLD AUTO: 9.1 % — SIGNIFICANT CHANGE UP (ref 2–14)
NEUTROPHILS # BLD AUTO: 15.59 K/UL — HIGH (ref 1.8–7.4)
NEUTROPHILS NFR BLD AUTO: 82.5 % — HIGH (ref 43–77)
NITRITE UR-MCNC: NEGATIVE — SIGNIFICANT CHANGE UP
NITRITE UR-MCNC: NEGATIVE — SIGNIFICANT CHANGE UP
NRBC # BLD: 0 /100 WBCS — SIGNIFICANT CHANGE UP (ref 0–0)
NRBC # BLD: 0 /100 WBCS — SIGNIFICANT CHANGE UP (ref 0–0)
PH UR: 6 — SIGNIFICANT CHANGE UP (ref 5–8)
PH UR: 7 — SIGNIFICANT CHANGE UP (ref 5–8)
PLAT MORPH BLD: ABNORMAL
PLATELET # BLD AUTO: 409 K/UL — HIGH (ref 150–400)
PLATELET # BLD AUTO: 413 K/UL — HIGH (ref 150–400)
POTASSIUM SERPL-MCNC: 3.5 MMOL/L — SIGNIFICANT CHANGE UP (ref 3.5–5.3)
POTASSIUM SERPL-MCNC: 3.6 MMOL/L — SIGNIFICANT CHANGE UP (ref 3.5–5.3)
POTASSIUM SERPL-SCNC: 3.5 MMOL/L — SIGNIFICANT CHANGE UP (ref 3.5–5.3)
POTASSIUM SERPL-SCNC: 3.6 MMOL/L — SIGNIFICANT CHANGE UP (ref 3.5–5.3)
PROT SERPL-MCNC: 7.5 G/DL — SIGNIFICANT CHANGE UP (ref 6–8.3)
PROT SERPL-MCNC: 7.8 G/DL — SIGNIFICANT CHANGE UP (ref 6–8.3)
PROT UR-MCNC: 30 MG/DL
PROT UR-MCNC: 30 MG/DL
PROTHROM AB SERPL-ACNC: 15.4 SEC — HIGH (ref 10.6–13.6)
RAPID RVP RESULT: SIGNIFICANT CHANGE UP
RBC # BLD: 3.21 M/UL — LOW (ref 3.8–5.2)
RBC # BLD: 3.26 M/UL — LOW (ref 3.8–5.2)
RBC # FLD: 13.7 % — SIGNIFICANT CHANGE UP (ref 10.3–14.5)
RBC # FLD: 14 % — SIGNIFICANT CHANGE UP (ref 10.3–14.5)
RBC BLD AUTO: SIGNIFICANT CHANGE UP
RBC CASTS # UR COMP ASSIST: ABNORMAL /HPF (ref 0–4)
SARS-COV-2 RNA SPEC QL NAA+PROBE: SIGNIFICANT CHANGE UP
SARS-COV-2 RNA SPEC QL NAA+PROBE: SIGNIFICANT CHANGE UP
SODIUM SERPL-SCNC: 137 MMOL/L — SIGNIFICANT CHANGE UP (ref 135–145)
SODIUM SERPL-SCNC: 138 MMOL/L — SIGNIFICANT CHANGE UP (ref 135–145)
SP GR SPEC: 1 — LOW (ref 1.01–1.02)
SP GR SPEC: 1 — LOW (ref 1.01–1.02)
UROBILINOGEN FLD QL: 1
UROBILINOGEN FLD QL: 1
WBC # BLD: 18.89 K/UL — HIGH (ref 3.8–10.5)
WBC # BLD: 19.59 K/UL — HIGH (ref 3.8–10.5)
WBC # FLD AUTO: 18.89 K/UL — HIGH (ref 3.8–10.5)
WBC # FLD AUTO: 19.59 K/UL — HIGH (ref 3.8–10.5)
WBC UR QL: SIGNIFICANT CHANGE UP

## 2021-08-17 PROCEDURE — 99285 EMERGENCY DEPT VISIT HI MDM: CPT

## 2021-08-17 PROCEDURE — 36415 COLL VENOUS BLD VENIPUNCTURE: CPT

## 2021-08-17 PROCEDURE — G0463: CPT

## 2021-08-17 PROCEDURE — 80053 COMPREHEN METABOLIC PANEL: CPT

## 2021-08-17 PROCEDURE — 81001 URINALYSIS AUTO W/SCOPE: CPT

## 2021-08-17 PROCEDURE — U0003: CPT

## 2021-08-17 PROCEDURE — U0005: CPT

## 2021-08-17 PROCEDURE — 76856 US EXAM PELVIC COMPLETE: CPT | Mod: 26

## 2021-08-17 PROCEDURE — 84702 CHORIONIC GONADOTROPIN TEST: CPT

## 2021-08-17 PROCEDURE — 86900 BLOOD TYPING SEROLOGIC ABO: CPT

## 2021-08-17 PROCEDURE — 87086 URINE CULTURE/COLONY COUNT: CPT

## 2021-08-17 PROCEDURE — 86850 RBC ANTIBODY SCREEN: CPT

## 2021-08-17 PROCEDURE — 74177 CT ABD & PELVIS W/CONTRAST: CPT | Mod: 26,MA

## 2021-08-17 PROCEDURE — 86901 BLOOD TYPING SEROLOGIC RH(D): CPT

## 2021-08-17 PROCEDURE — 85027 COMPLETE CBC AUTOMATED: CPT

## 2021-08-17 RX ORDER — CEFOTETAN DISODIUM 1 G
2 VIAL (EA) INJECTION ONCE
Refills: 0 | Status: COMPLETED | OUTPATIENT
Start: 2021-08-17 | End: 2021-08-17

## 2021-08-17 RX ORDER — IBUPROFEN 200 MG
600 TABLET ORAL EVERY 6 HOURS
Refills: 0 | Status: DISCONTINUED | OUTPATIENT
Start: 2021-08-17 | End: 2021-08-19

## 2021-08-17 RX ORDER — FERROUS SULFATE 325(65) MG
325 TABLET ORAL THREE TIMES A DAY
Refills: 0 | Status: DISCONTINUED | OUTPATIENT
Start: 2021-08-17 | End: 2021-08-19

## 2021-08-17 RX ORDER — TRANEXAMIC ACID 100 MG/ML
1300 INJECTION, SOLUTION INTRAVENOUS THREE TIMES A DAY
Refills: 0 | Status: DISCONTINUED | OUTPATIENT
Start: 2021-08-18 | End: 2021-08-19

## 2021-08-17 RX ORDER — ACETAMINOPHEN 500 MG
1000 TABLET ORAL ONCE
Refills: 0 | Status: COMPLETED | OUTPATIENT
Start: 2021-08-17 | End: 2021-08-17

## 2021-08-17 RX ORDER — DOCUSATE SODIUM 100 MG
100 CAPSULE ORAL
Refills: 0 | Status: DISCONTINUED | OUTPATIENT
Start: 2021-08-17 | End: 2021-08-19

## 2021-08-17 RX ORDER — SODIUM CHLORIDE 9 MG/ML
1000 INJECTION INTRAMUSCULAR; INTRAVENOUS; SUBCUTANEOUS ONCE
Refills: 0 | Status: COMPLETED | OUTPATIENT
Start: 2021-08-17 | End: 2021-08-17

## 2021-08-17 RX ORDER — CEFOTETAN DISODIUM 1 G
2 VIAL (EA) INJECTION EVERY 12 HOURS
Refills: 0 | Status: DISCONTINUED | OUTPATIENT
Start: 2021-08-18 | End: 2021-08-19

## 2021-08-17 RX ORDER — SODIUM CHLORIDE 9 MG/ML
1000 INJECTION, SOLUTION INTRAVENOUS
Refills: 0 | Status: DISCONTINUED | OUTPATIENT
Start: 2021-08-17 | End: 2021-08-19

## 2021-08-17 RX ADMIN — SODIUM CHLORIDE 1000 MILLILITER(S): 9 INJECTION INTRAMUSCULAR; INTRAVENOUS; SUBCUTANEOUS at 16:41

## 2021-08-17 RX ADMIN — Medication 400 MILLIGRAM(S): at 16:41

## 2021-08-17 RX ADMIN — Medication 1000 MILLIGRAM(S): at 20:04

## 2021-08-17 RX ADMIN — SODIUM CHLORIDE 1000 MILLILITER(S): 9 INJECTION INTRAMUSCULAR; INTRAVENOUS; SUBCUTANEOUS at 20:03

## 2021-08-17 RX ADMIN — SODIUM CHLORIDE 1000 MILLILITER(S): 9 INJECTION INTRAMUSCULAR; INTRAVENOUS; SUBCUTANEOUS at 20:15

## 2021-08-17 RX ADMIN — Medication 100 GRAM(S): at 20:15

## 2021-08-17 RX ADMIN — Medication 110 MILLIGRAM(S): at 20:44

## 2021-08-17 NOTE — H&P PST ADULT - NEGATIVE ENMT SYMPTOMS
tingling itch in throat  smell and taste intact/no hearing difficulty/no ear pain/no tinnitus/no vertigo/no sinus symptoms/no nasal congestion/no nasal discharge

## 2021-08-17 NOTE — H&P ADULT - NSHPREVIEWOFSYSTEMS_GEN_ALL_CORE
Constitutional: +fever, +chills  Respiratory: No Shortness of breath/Chest pain  Cardiac: No Palpitations  : No frequency/dysuria, No vaginal discharge, +vaginal bleeding,

## 2021-08-17 NOTE — H&P PST ADULT - ASSESSMENT
42 yo female s/p Hodgkins scheduled for Dilation and Curettage Hysteroscopy w/IUD Removal on 8/20/21 with Dr Faye

## 2021-08-17 NOTE — H&P PST ADULT - NSICDXPASTSURGICALHX_GEN_ALL_CORE_FT
PAST SURGICAL HISTORY:  Encounter for insertion of venous access port removed 29 years ago    S/P cholecystectomy 2019

## 2021-08-17 NOTE — H&P PST ADULT - HISTORY OF PRESENT ILLNESS
42 yo female scheduled for Dilation and Curettage Hysteroscopy w/IUD Removal on 8/20/21 with Dr Faye.  Patient states she had vaginal bleeding for the past 2 months.  Current bleeding episode started 2 weeks ago.  LMP 8/8/21.  Patient has weakness and constant pain in right side since 8/13/21.    42 yo female s/p Hodgkins scheduled for Dilation and Curettage Hysteroscopy w/IUD Removal on 8/20/21 with Dr Faye  Patient states she had vaginal bleeding for the past 2 months.  Current bleeding episode started 2 weeks ago.  LMP 8/8/21.  Patient has had weakness and constant pain in right side since     8/13/21. Patient states she has had chills and  sweating since last night.  Today patient has a tickle in her throat and feels extremely weak.  Patient has difficulty walking 8/17 patient is weak, febrile 101.4 transferred to ED with her  for further evaluation

## 2021-08-17 NOTE — H&P PST ADULT - NSICDXPASTMEDICALHX_GEN_ALL_CORE_FT
PAST MEDICAL HISTORY:  Cholecystitis     Non Hodgkin's lymphoma     Retained intrauterine contraceptive device in pregnancy, unspecified trimester      PAST MEDICAL HISTORY:  Cholecystitis     H/O fever 8/17    Non Hodgkin's lymphoma     Retained intrauterine contraceptive device in pregnancy, unspecified trimester

## 2021-08-17 NOTE — H&P PST ADULT - NEGATIVE NEUROLOGICAL SYMPTOMS
no transient paralysis/no paresthesias/no generalized seizures/no focal seizures/no syncope/no tremors

## 2021-08-17 NOTE — H&P ADULT - HISTORY OF PRESENT ILLNESS
Cc: sent from PST due to fever 101F     HPI: 41y [id number] sent from PSTs due to fever. Patient is scheduled for Hysteroscopy, D&C, IUD removal and was at the hospital doing her PST. She had an unsuccessful attempt at removing a Mirena IUD at the office on 2021. The IUD was not clearly seen on imaging, including an MRI. However, Dr. Faye saw it on US that day.   Of note, the patient has been experiencing heavy menstrual bleeding for the past few months. Pelvic US at the office showed her myomas have increased in size. An endometrial biopsy was recommended but has not been performed as of yet. A sample was to be taken during her upcoming procedure.   A Mirena IUD has been in place since 2018.      Past History:     Ob Hx: FT  x 2, PT  of IUFD at 23 wks.    Gyn Hx: Myomas, Hx abnormal Pap smears    PMH: Non-Hodgkin's lymphoma in childhood, cholecystitis    PSH: Port-a-cath for chemo, removed  S/P cholecystectomy     Medications:   Mirena IUD    FAMILY HISTORY: prostate cancer, FH: hypertension

## 2021-08-17 NOTE — ED PROVIDER NOTE - ATTENDING CONTRIBUTION TO CARE
41 female states she was getting pre-surgical testing for removal of IUD, noted to have a fever and sent to ER, states IUD was placed in 2018 to help control vaginal bleeding, for past month has had increased vaginal bleeding, saw her GYN, felt it was not helping and was attempted to remove it in office and unsuccesful so was scheduled 8/20/21 for reoval in OR with possible D+C, yesterday patient felt hot, light headed, today noted to have fever 101F, states she has pain to right lower quadrant, patient alert and oriented, warm to touch, heart and lungs clear, abdomen soft, non tender, f/u labs, cultures, US pelvis, ct abdomen/pelvis, RVP, iv fluids, tylenol, re-eval.

## 2021-08-17 NOTE — H&P ADULT - NSHPPHYSICALEXAM_GEN_ALL_CORE
T(F): 99.7 (17 Aug 2021 19:57), Max: 101.4 (17 Aug 2021 13:22)  HR: 111 (17 Aug 2021 19:57) (99 - 112)  BP: 103/69 (17 Aug 2021 19:57) (103/69 - 137/85)  BP(mean): 82 (17 Aug 2021 13:22) (82 - 82)  RR: 16 (17 Aug 2021 19:57) (16 - 18)  SpO2: 99% (17 Aug 2021 19:57) (99% - 100%)  Gen: AAO x 3  Abd: Soft, nontender**  VE: ** T(F): 99.7 (17 Aug 2021 19:57), Max: 101.4 (17 Aug 2021 13:22)  HR: 111 (17 Aug 2021 19:57) (99 - 112)  BP: 103/69 (17 Aug 2021 19:57) (103/69 - 137/85)  BP(mean): 82 (17 Aug 2021 13:22) (82 - 82)  RR: 16 (17 Aug 2021 19:57) (16 - 18)  SpO2: 99% (17 Aug 2021 19:57) (99% - 100%)  Gen: AAO x 3  Abd: Soft, nontender  VE: +bleeding, No CMT, no uterine tenderness, mild left tenderness

## 2021-08-17 NOTE — H&P ADULT - NSHPLABSRESULTS_GEN_ALL_CORE
8.9    18.89 )-----------( 413      ( 17 Aug 2021 16:22 )             27.5     137  |  105  |  4<L>  ----------------------------<  100<H>  3.5   |  28  |  0.49<L>    Ca    8.7      17 Aug 2021 16:22    TPro  7.5  /  Alb  2.8<L>  /  TBili  0.7  /  DBili  x   /  AST  39<H>  /  ALT  56  /  AlkPhos  106    PT/INR - ( 17 Aug 2021 16:22 )   PT: 15.4 sec;   INR: 1.33 ratio    PTT - ( 17 Aug 2021 16:22 )  PTT:26.8 sec    Urinalysis Basic - ( 17 Aug 2021 16:44 )  Color: Yellow / Appearance: Clear / S.005 / pH: x  Gluc: x / Ketone: Moderate  / Bili: Negative / Urobili: 1   Blood: x / Protein: 30 mg/dL / Nitrite: Negative   Leuk Esterase: Small / RBC: 25-50 /HPF / WBC 3-5   Sq Epi: x / Non Sq Epi: Occasional / Bacteria: Few    HCG Quantitative, Serum: <1 mIU/mL ( @ 16:22)    US PELVIC COMPLETE  2021      INTERPRETATION:  CLINICAL INFORMATION: Fever. Right lower quadrant pain.    LMP: Unknown.    COMPARISON: None available.    TECHNIQUE:  Transabdominal pelvic sonogram only. Patient declined endovaginal imaging.    FINDINGS:    Uterus: 16.8 x 9.4 x 13.7 cm. Multiple leiomyomas, measuring up to 7.8 x 7.0 x 7.3 cm.  Endometrium: 1.3 cm.  Right ovary: Not visualized.  Left ovary: Not visualized.  Fluid: None.    IMPRESSION:  Multiple uterine leiomyomas.  Ovaries are not visualized.    CT Abdomen and Pelvis w/ IV Cont (21 @ 17:14)    INTERPRETATION:  CLINICAL INFORMATION: 41 years  Female with RLQ pain, fever, vaginal bleeding. History of recent IUD removal attempts.    COMPARISON: None.    CONTRAST/COMPLICATIONS:  IV Contrast: Omnipaque 350  90 cc administered 10 cc discarded  Oral Contrast: NONE  Complications: None reported at time of study completion    PROCEDURE:  CT of the Abdomen and Pelvis was performed.  Sagittal and coronal reformats were performed.    FINDINGS:  LOWER CHEST: Within normal limits.  LIVER: Within normal limits.  BILE DUCTS: Normal caliber.  GALLBLADDER: Within normal limits.  SPLEEN: Within normal limits.  PANCREAS: Within normal limits.  ADRENALS: Within normal limits.  KIDNEYS/URETERS: Within normal limits.  BLADDER: Within normal limits.  REPRODUCTIVE ORGANS: Enlarged uterus 17.2 cm sagittal by 9.7 cm AP by 17.1 cm transverse. 9.5 x 6.7 x 8.2 cm right-sided fundal necrotic myoma. 5.8 x 6.3 x 5.8 cm left mid body necrotic myoma containing small debris. 1.7 cm calcified fundal myoma. Crescentic fluid left side of the uterus tracking anteriorly may reflect partially obstructing endometrial cavity or hydrosalpinx. Unremarkable right ovary. Left ovary not visualized. No IUD identified. Punctate hypodensity in the endometrial cavity (2:74). Fluid in the endometrial cavity.  BOWEL: No bowel obstruction. Appendix is normal.  PERITONEUM: Minimal right pelvic ascites.  VESSELS: Within normal limits.  RETROPERITONEUM/LYMPH NODES: No lymphadenopathy.  ABDOMINAL WALL: Within normal limits.  BONES: Within normal limits.    IMPRESSION:  2 dominant necrotic myomata. The left contains small debris. Correlate clinically for superimposed infection. Crescentic fluid along the left side of the uterus may represent partially obstructed endometrium or hydrosalpinx. Small right pelvic ascites. Punctate high attenuation in the endometrial cavity may represent calcification or may be related to recent IUD removal attempt. No IUD is identified.

## 2021-08-17 NOTE — ED PROVIDER NOTE - PROGRESS NOTE DETAILS
paged patient's GYN Dr. Faye [phone], awaiting call back spoke with on call GYN Dr. Mark, will come to see patient, recommend cefoxitan, doxycycline and admit

## 2021-08-17 NOTE — H&P ADULT - ASSESSMENT
A/P: 40 yo   1. Admitted with fever.   -Medicine consult  -Regular diet  -Ambulate   -Vitals per routine   -IV fluids while febrile   2. Presumptive PID. Patient had intrauterine instrumentation during an attempt to remove her IUD.   -Cefotetan 2 g IV q 12 hrs + Doxycycline 100 mg IV q 12 hrs  -Tylenol prn fever   -Monitor WBC and clinical response   3. Necrotic myomas seen on CT -- Possibly degenerating myomas.   -Pelvic MRI w/wo contrast in AM.   -NSAID prn pain   4. Menorrhagia -- Likely due to enlarging myomas +/- malpositioned IUD. Less likely endometrial neoplasia as Mirena IUD has been in place. However, will need to be ruled out.   -Lysteda 1300 mg 3x/day until bleeding stops or max of 5 days, whichever one comes first.   -Monitor Hb and vaginal bleeding   -D&C pending   5. IUD in situ, r/o malposition vs expelled  -Need to confirm position with MRI.   -Hysteroscopic IUD removal pending       A/P: 42 yo   1. Admitted with fever.   -Medicine consult  -Regular diet  -Ambulate   -Vitals per routine   -IV fluids while febrile   2. Presumptive PID. Patient had intrauterine instrumentation during an attempt to remove her IUD.   -Cefotetan 2 g IV q 12 hrs + Doxycycline 100 mg IV q 12 hrs  -Tylenol prn fever   -Monitor WBC and clinical response   3. Necrotic myomas seen on CT -- Possibly degenerating myomas.   -Pelvic MRI w/wo contrast in AM.   -NSAID prn pain   4. Menorrhagia -- Likely due to enlarging myomas +/- malpositioned IUD. Less likely endometrial neoplasia as Mirena IUD has been in place. However, will need to be ruled out.   -Lysteda 1300 mg 3x/day until bleeding stops or max of 5 days, whichever one comes first.   -Monitor Hb and vaginal bleeding   -D&C pending   5. Anemia, likely due to AUB  -Iron tid  6. IUD in situ, r/o malposition vs expelled  -Need to confirm position with MRI.   -Hysteroscopic IUD removal pending       A/P: 42 yo   1. Admitted with fever.   -Medicine consult  -Regular diet  -Ambulate   -Vitals per routine   -IV fluids while febrile   2. Presumptive PID. Patient had intrauterine instrumentation during an attempt to remove her IUD. However, no significant tenderness on exam.   -Cefotetan 2 g IV q 12 hrs + Doxycycline 100 mg IV q 12 hrs  -Tylenol prn fever   -Monitor WBC and clinical response   3. Necrotic myomas seen on CT -- Possibly degenerating myomas. Possible source of fever.  -Pelvic MRI w/wo contrast in AM.   -NSAID prn pain   4. Menorrhagia -- Likely due to enlarging myomas +/- malpositioned IUD. Less likely endometrial neoplasia as Mirena IUD has been in place. However, will need to be ruled out.   -Lysteda 1300 mg 3x/day until bleeding stops or max of 5 days, whichever one comes first.   -Monitor Hb and vaginal bleeding   -D&C pending   5. Anemia, likely due to AUB  -Iron tid  6. IUD in situ, r/o malposition vs expelled  -Need to confirm position with MRI.   -Hysteroscopic IUD removal pending       A/P: 40 yo   1. Admitted with fever.   -Medicine consult  -Regular diet  -Ambulate   -Vitals per routine   -IV fluids while febrile   -Follow up blood cx  2. Presumptive PID. Patient had intrauterine instrumentation during an attempt to remove her IUD. However, no significant tenderness on exam.   -Cefotetan 2 g IV q 12 hrs + Doxycycline 100 mg IV q 12 hrs  -Tylenol prn fever   -Monitor WBC and clinical response   3. Necrotic myomas seen on CT -- Possibly degenerating myomas. Possible source of fever.  -Pelvic MRI w/wo contrast in AM.   -NSAID prn pain   4. Menorrhagia -- Likely due to enlarging myomas +/- malpositioned IUD. Less likely endometrial neoplasia as Mirena IUD has been in place. However, will need to be ruled out.   -Lysteda 1300 mg 3x/day until bleeding stops or max of 5 days, whichever one comes first.   -Monitor Hb and vaginal bleeding   -D&C pending   5. Anemia, likely due to AUB  -Iron tid  -Colace 100 mg bid  6. IUD in situ, r/o malposition vs expelled  -Need to confirm position with MRI.   -Hysteroscopic IUD removal pending

## 2021-08-17 NOTE — ED PROVIDER NOTE - OBJECTIVE STATEMENT
41 year old female with PMH of uterine fibroids, cholecystitis, and Non-Hodgkin's lymphoma presents to the ED with fever. States LMP was 8/8 with heavy bleeding for 1 day. On 8/13 she started experiencing RLQ pain and lower back pain, describes as constant, sharp, rates 7/10, tried Advil with minimal relief. On 8/14 she started bleeding lightly with progression to heavy bleeding on 8/16 with blood clots. IUD placed in 2018 for heavy bleeding, surgery for a D and C and IUD removal with Dr. Faye(Gynecologist) was scheduled for this Friday at(8/20), due to being unsuccessful in the office with IUD removal. Today she presented to pre-op due to feeling weak, advised to come to the emergency room for a fever of 100.9 F, yesterday she started feeling chills. Today she soaked 4 maxi pads with heavy bleeding, has decreased appetite. Denies dysuria, chest pain, shortness of breath, nausea, vomiting, diarrhea or dizziness.

## 2021-08-18 LAB
ALBUMIN SERPL ELPH-MCNC: 2.2 G/DL — LOW (ref 3.3–5)
ALP SERPL-CCNC: 89 U/L — SIGNIFICANT CHANGE UP (ref 40–120)
ALT FLD-CCNC: 46 U/L — SIGNIFICANT CHANGE UP (ref 12–78)
ANION GAP SERPL CALC-SCNC: 6 MMOL/L — SIGNIFICANT CHANGE UP (ref 5–17)
AST SERPL-CCNC: 35 U/L — SIGNIFICANT CHANGE UP (ref 15–37)
BILIRUB SERPL-MCNC: 0.6 MG/DL — SIGNIFICANT CHANGE UP (ref 0.2–1.2)
BUN SERPL-MCNC: 4 MG/DL — LOW (ref 7–23)
CALCIUM SERPL-MCNC: 7.9 MG/DL — LOW (ref 8.5–10.1)
CHLORIDE SERPL-SCNC: 110 MMOL/L — HIGH (ref 96–108)
CO2 SERPL-SCNC: 26 MMOL/L — SIGNIFICANT CHANGE UP (ref 22–31)
COVID-19 SPIKE DOMAIN AB INTERP: POSITIVE
COVID-19 SPIKE DOMAIN ANTIBODY RESULT: >250 U/ML — HIGH
CREAT SERPL-MCNC: 0.41 MG/DL — LOW (ref 0.5–1.3)
CULTURE RESULTS: SIGNIFICANT CHANGE UP
FERRITIN SERPL-MCNC: 83 NG/ML — SIGNIFICANT CHANGE UP (ref 15–150)
FOLATE SERPL-MCNC: 14.4 NG/ML — SIGNIFICANT CHANGE UP
GLUCOSE SERPL-MCNC: 106 MG/DL — HIGH (ref 70–99)
HCT VFR BLD CALC: 23 % — LOW (ref 34.5–45)
HGB BLD-MCNC: 7.4 G/DL — LOW (ref 11.5–15.5)
IRON SATN MFR SERPL: 3 % — LOW (ref 14–50)
IRON SATN MFR SERPL: 7 UG/DL — LOW (ref 30–160)
MCHC RBC-ENTMCNC: 27 PG — SIGNIFICANT CHANGE UP (ref 27–34)
MCHC RBC-ENTMCNC: 32.2 GM/DL — SIGNIFICANT CHANGE UP (ref 32–36)
MCV RBC AUTO: 83.9 FL — SIGNIFICANT CHANGE UP (ref 80–100)
NRBC # BLD: 0 /100 WBCS — SIGNIFICANT CHANGE UP (ref 0–0)
PLATELET # BLD AUTO: 377 K/UL — SIGNIFICANT CHANGE UP (ref 150–400)
POTASSIUM SERPL-MCNC: 3.6 MMOL/L — SIGNIFICANT CHANGE UP (ref 3.5–5.3)
POTASSIUM SERPL-SCNC: 3.6 MMOL/L — SIGNIFICANT CHANGE UP (ref 3.5–5.3)
PROCALCITONIN SERPL-MCNC: <0.05 NG/ML — HIGH (ref 0–0.04)
PROT SERPL-MCNC: 6.4 G/DL — SIGNIFICANT CHANGE UP (ref 6–8.3)
RBC # BLD: 2.74 M/UL — LOW (ref 3.8–5.2)
RBC # FLD: 14.1 % — SIGNIFICANT CHANGE UP (ref 10.3–14.5)
SARS-COV-2 IGG+IGM SERPL QL IA: >250 U/ML — HIGH
SARS-COV-2 IGG+IGM SERPL QL IA: POSITIVE
SODIUM SERPL-SCNC: 142 MMOL/L — SIGNIFICANT CHANGE UP (ref 135–145)
SPECIMEN SOURCE: SIGNIFICANT CHANGE UP
TIBC SERPL-MCNC: 227 UG/DL — SIGNIFICANT CHANGE UP (ref 220–430)
UIBC SERPL-MCNC: 220 UG/DL — SIGNIFICANT CHANGE UP (ref 110–370)
VIT B12 SERPL-MCNC: 530 PG/ML — SIGNIFICANT CHANGE UP (ref 232–1245)
WBC # BLD: 16.98 K/UL — HIGH (ref 3.8–10.5)
WBC # FLD AUTO: 16.98 K/UL — HIGH (ref 3.8–10.5)

## 2021-08-18 PROCEDURE — 99223 1ST HOSP IP/OBS HIGH 75: CPT | Mod: GC

## 2021-08-18 PROCEDURE — 72197 MRI PELVIS W/O & W/DYE: CPT | Mod: 26

## 2021-08-18 RX ORDER — ACETAMINOPHEN 500 MG
650 TABLET ORAL EVERY 6 HOURS
Refills: 0 | Status: DISCONTINUED | OUTPATIENT
Start: 2021-08-18 | End: 2021-08-19

## 2021-08-18 RX ADMIN — Medication 100 GRAM(S): at 18:04

## 2021-08-18 RX ADMIN — Medication 600 MILLIGRAM(S): at 05:12

## 2021-08-18 RX ADMIN — SODIUM CHLORIDE 75 MILLILITER(S): 9 INJECTION, SOLUTION INTRAVENOUS at 06:36

## 2021-08-18 RX ADMIN — Medication 325 MILLIGRAM(S): at 06:38

## 2021-08-18 RX ADMIN — Medication 600 MILLIGRAM(S): at 22:18

## 2021-08-18 RX ADMIN — Medication 110 MILLIGRAM(S): at 06:36

## 2021-08-18 RX ADMIN — Medication 650 MILLIGRAM(S): at 11:23

## 2021-08-18 RX ADMIN — Medication 100 GRAM(S): at 06:39

## 2021-08-18 RX ADMIN — Medication 600 MILLIGRAM(S): at 02:53

## 2021-08-18 RX ADMIN — TRANEXAMIC ACID 1300 MILLIGRAM(S): 100 INJECTION, SOLUTION INTRAVENOUS at 21:56

## 2021-08-18 RX ADMIN — Medication 100 MILLIGRAM(S): at 18:00

## 2021-08-18 RX ADMIN — Medication 325 MILLIGRAM(S): at 21:56

## 2021-08-18 RX ADMIN — Medication 325 MILLIGRAM(S): at 14:46

## 2021-08-18 RX ADMIN — Medication 650 MILLIGRAM(S): at 13:24

## 2021-08-18 RX ADMIN — SODIUM CHLORIDE 75 MILLILITER(S): 9 INJECTION, SOLUTION INTRAVENOUS at 21:56

## 2021-08-18 RX ADMIN — Medication 100 MILLIGRAM(S): at 06:36

## 2021-08-18 RX ADMIN — Medication 600 MILLIGRAM(S): at 21:55

## 2021-08-18 RX ADMIN — Medication 110 MILLIGRAM(S): at 18:32

## 2021-08-18 RX ADMIN — TRANEXAMIC ACID 1300 MILLIGRAM(S): 100 INJECTION, SOLUTION INTRAVENOUS at 14:44

## 2021-08-18 RX ADMIN — TRANEXAMIC ACID 1300 MILLIGRAM(S): 100 INJECTION, SOLUTION INTRAVENOUS at 06:37

## 2021-08-18 NOTE — PROGRESS NOTE ADULT - ASSESSMENT
42 yo  p/w fever, suspected PID    1. Fever, presumed PID  - As per Gyn, concern for PID, pt started on cefotetan and doxycycline  - monitor for fevers, and trend WBC; tylenol prn  - f/u blood cultures  - IVF LR 2 75 cc/hr  - Awaiting MRI of abd, will f/u on results    2. Necrotic myomas seen on CT, with degeneration, may possibly source of fever  - Pelvic MRI w/wo contrast pending, management as per gyn  - motrin and tylneol for pain control    3. Menorrhagia - may be 2/2 myomas, possibly 2/2 malpositioned IUD   As per Gyn:  -Lysteda 1300 mg 3x/day until bleeding stops or max of 5 days, whichever one comes first.   -Monitor Hb and vaginal bleeding   -D&C pending     4. Anemia  - trend Hb on CBC to assess for possible active bleeding  - cont ferrous sulfate supp  - consider iron studies if no recent outpt records

## 2021-08-18 NOTE — ED ADULT NURSE REASSESSMENT NOTE - COMFORT CARE
ambulated to bathroom
plan of care explained/warm blanket provided
meal provided/plan of care explained/po fluids offered

## 2021-08-18 NOTE — CONSULT NOTE ADULT - ASSESSMENT
41y [id number] sent from PSTs due to fever. Patient is scheduled for Hysteroscopy, D&C, IUD removal and was at the hospital doing her PST. She had an unsuccessful attempt at removing a Mirena IUD at the office on 8/5/2021. The IUD was not clearly seen on imaging, including an MRI. However, Dr. Faye saw it on US that day.   Of note, the patient has been experiencing heavy menstrual bleeding for the past few months. Pelvic US at the office showed her myomas have increased in size. An endometrial biopsy was recommended but has not been performed as of yet. A sample was to be taken during her upcoming procedure.   A Mirena IUD has been in place since 5/2018.    RECOMMENDATIONS    Noted fever, significant leukocytosis, menstrual irregularity, and abn imaging concerning for uterine associated process     pt clinically very stable but do agree with abx pending clarification of clinical picture. Will add additional testing with further recs to follow as clinical picture is clarified    Thank you for consulting us and involving us in the management of this most interesting and challenging case.  We will follow along in the care of this patient. Please call us at [phone] or text me directly on my cell# at [phone] with any concerns.

## 2021-08-18 NOTE — CONSULT NOTE ADULT - SUBJECTIVE AND OBJECTIVE BOX
Patient is a 41y old  Female who presents with a chief complaint of fever (17 Aug 2021 23:03)    HPI:  Cc: sent from PST due to fever 101F     HPI: 41y [id number] sent from [facility] due to fever. Patient is scheduled for Hysteroscopy, D&C, IUD removal and was at the hospital doing her PST. She had an unsuccessful attempt at removing a Mirena IUD at the office on 2021. The IUD was not clearly seen on imaging, including an MRI. However, Dr. Faye saw it on US that day.   Of note, the patient has been experiencing heavy menstrual bleeding for the past few months. Pelvic US at the office showed her myomas have increased in size. An endometrial biopsy was recommended but has not been performed as of yet. A sample was to be taken during her upcoming procedure.   A Mirena IUD has been in place since 2018.   (17 Aug 2021 23:03)    Interval HPI: Medicine was consulted for further work up given unimpressive pelvic exam by GYN.  40 y/o F with PMHx of Non-Hodgkins lymphoma, Myomas, abnormal PAP smears presented to the ED from [facility] for fever. She had an unsuccessful attempt of IUD removal in the office. She has heavy vaginal bleeding for the past few months. She was scheduled for Dilation and Curettage Hysteroscopy with IUD Removal on 21 with Dr Faye. Seen by GYN here d/t CT findings of necrotic myomas and abnormal endometrial cavity, possibly related to recent instrumentation in IUD removal attempt. Being treated for presumed PID, however with unimpressive pelvic exam by GYN. Patient admits to fever and chills. Denies chest pain, palpitations, dyspnea, headache, dizziness, abdominal pain, n/v/d.      I&O's Summary      PAST MEDICAL & SURGICAL HISTORY:  Cholecystitis    Non Hodgkin&#x27;s lymphoma    Retained intrauterine contraceptive device in pregnancy, unspecified trimester  for heavy bleeding and uterine fibroids    Encounter for insertion of venous access port  removed 29 years ago    S/P cholecystectomy  2019    Ob Hx: FT  x 2, PT  of IUFD at 23 wks.    Gyn Hx: Myomas, Hx abnormal Pap smears      Allergies    No Known Allergies    Intolerances        SOCIAL HISTORY  , lives with spouse  Alcohol: denies  Tobacco: denies  Illicit substance use: denies  Occupation:     FAMILY HISTORY:  FH: prostate cancer    FH: hypertension        Home Medications:  Mirena IUD      LABS:                        8.9    18.89 )-----------( 413      ( 17 Aug 2021 16:22 )             27.5         137  |  105  |  4<L>  ----------------------------<  100<H>  3.5   |  28  |  0.49<L>    Ca    8.7      17 Aug 2021 16:22    TPro  7.5  /  Alb  2.8<L>  /  TBili  0.7  /  DBili  x   /  AST  39<H>  /  ALT  56  /  AlkPhos  106      PT/INR - ( 17 Aug 2021 16:22 )   PT: 15.4 sec;   INR: 1.33 ratio         PTT - ( 17 Aug 2021 16:22 )  PTT:26.8 sec  Urinalysis Basic - ( 17 Aug 2021 16:44 )    Color: Yellow / Appearance: Clear / S.005 / pH: x  Gluc: x / Ketone: Moderate  / Bili: Negative / Urobili: 1   Blood: x / Protein: 30 mg/dL / Nitrite: Negative   Leuk Esterase: Small / RBC: 25-50 /HPF / WBC 3-5   Sq Epi: x / Non Sq Epi: Occasional / Bacteria: Few      CAPILLARY BLOOD GLUCOSE            Urinalysis Basic - ( 17 Aug 2021 16:44 )    Color: Yellow / Appearance: Clear / S.005 / pH: x  Gluc: x / Ketone: Moderate  / Bili: Negative / Urobili: 1   Blood: x / Protein: 30 mg/dL / Nitrite: Negative   Leuk Esterase: Small / RBC: 25-50 /HPF / WBC 3-5   Sq Epi: x / Non Sq Epi: Occasional / Bacteria: Few        MEDICATIONS  (STANDING):  cefoTEtan  IVPB 2 Gram(s) IV Intermittent every 12 hours  docusate sodium 100 milliGRAM(s) Oral two times a day  doxycycline IVPB 100 milliGRAM(s) IV Intermittent every 12 hours  ferrous    sulfate 325 milliGRAM(s) Oral three times a day  lactated ringers. 1000 milliLiter(s) (75 mL/Hr) IV Continuous <Continuous>  tranexamic  acid 1300 milliGRAM(s) Oral three times a day    MEDICATIONS  (PRN):  acetaminophen   Tablet .. 650 milliGRAM(s) Oral every 6 hours PRN Temp greater or equal to 38C (100.4F)  ibuprofen  Tablet. 600 milliGRAM(s) Oral every 6 hours PRN Mild Pain (1 - 3), Moderate Pain (4 - 6), Severe Pain (7 - 10)      REVIEW OF SYSTEMS:  CONSTITUTIONAL: admits fevers and chills  HEENT: denies blurred vision, sore throat  SKIN: denies new lesions, rash  CARDIOVASCULAR: denies chest pain, chest pressure, palpitations  RESPIRATORY: denies shortness of breath, sputum production  GASTROINTESTINAL: denies nausea, vomiting, diarrhea, abdominal pain  GENITOURINARY: admits heavy menses  NEUROLOGICAL: denies numbness, headache, focal weakness  MUSCULOSKELETAL: denies new joint pain, muscle aches  HEMATOLOGIC: denies gross bleeding, bruising  LYMPHATICS: denies enlarged lymph nodes, extremity swelling  PSYCHIATRIC: denies recent changes in anxiety, depression  ENDOCRINOLOGIC: denies sweating, cold or heat intolerance    RADIOLOGY & ADDITIONAL TESTS:    < from: CT Abdomen and Pelvis w/ IV Cont (21 @ 17:14) >    EXAM:  CT ABDOMEN AND PELVIS IC                            *** ADDENDUM 2021  ***    Findings discussed with Dr. Alcaraz at 2021 6:50 PM with readback.    --- End of Report ---    *** END OF ADDENDUM 2021  ***      PROCEDURE DATE:  2021          INTERPRETATION:  CLINICAL INFORMATION: 41 years  Female with RLQ pain, fever, vaginal bleeding. History of recent IUD removal attempts.    COMPARISON: None.    CONTRAST/COMPLICATIONS:  IV Contrast: Omnipaque 350  90 cc administered 10 cc discarded  Oral Contrast: NONE  Complications: None reported at time of study completion    PROCEDURE:  CT of the Abdomen and Pelvis was performed.  Sagittal and coronal reformats were performed.    FINDINGS:  LOWER CHEST: Within normal limits.    LIVER: Within normal limits.  BILE DUCTS: Normal caliber.  GALLBLADDER: Within normal limits.  SPLEEN: Within normal limits.  PANCREAS: Within normal limits.  ADRENALS: Within normal limits.  KIDNEYS/URETERS: Within normal limits.    BLADDER: Within normal limits.  REPRODUCTIVE ORGANS: Enlarged uterus 17.2 cm sagittal by 9.7 cm AP by 17.1 cm transverse. 9.5 x 6.7 x 8.2 cm right-sided fundal necrotic myoma. 5.8 x 6.3 x 5.8 cm left mid body necrotic myoma containing small debris. 1.7 cm calcified fundal myoma. Crescentic fluid left side of the uterus tracking anteriorly may reflect partially obstructing endometrial cavity or hydrosalpinx. Unremarkable right ovary. Left ovary not visualized. No IUD identified. Punctate hypodensity in the endometrial cavity (2:74). Fluid in the endometrial cavity.    BOWEL: No bowel obstruction. Appendix is normal.  PERITONEUM: Minimal right pelvic ascites.  VESSELS: Within normal limits.  RETROPERITONEUM/LYMPH NODES: No lymphadenopathy.  ABDOMINAL WALL: Within normal limits.  BONES: Within normal limits.    IMPRESSION:  2 dominant necrotic myomata. The left contains small debris. Correlate clinically for superimposed infection.    Crescentic fluid along the left side of the uterus may represent partially obstructed endometrium or hydrosalpinx. Small right pelvic ascites.    Punctate high attenuation in the endometrial cavity may represent calcification or may be related to recent IUD removal attempt. No IUD is identified.    Recommend MRI for additional characterization.        --- End of Report ---    ***Please see the addendum at the top of this report. It may contain additional important information or changes.****        ECTOR MAY MD; Attending Radiologist  This document has been electronically signed. Aug 17 2021  5:35PM  Addend:ECTOR MAY MD; Attending Radiologist  This addendum was electronically signed on: Aug 17 2021  6:51PM.    < end of copied text >  < from: US Pelvis Complete (21 @ 16:44) >    EXAM:  US PELVIC COMPLETE                            PROCEDURE DATE:  2021          INTERPRETATION:  CLINICAL INFORMATION: Fever. Right lower quadrant pain.    LMP: Unknown.    COMPARISON: None available.    TECHNIQUE:  Transabdominal pelvic sonogram only. Patient declined endovaginal imaging.    FINDINGS:    Uterus: 16.8 x 9.4 x 13.7 cm. Multiple leiomyomas, measuring up to 7.8 x 7.0 x 7.3 cm.  Endometrium: 1.3 cm.    Right ovary: Not visualized.  Left ovary: Not visualized.    Fluid: None.    IMPRESSION:  Multiple uterine leiomyomas.    Ovaries are not visualized.    --- End of Report ---    JENNIFER MUKHERJEE MD; Attending Radiologist  This document has been electronically signed. Aug 17 2021  4:47PM    < end of copied text >    Imaging Personally Reviewed:  [x] YES  [ ] NO    Consultant(s) Notes Reviewed:  [x] YES  [ ] NO        PHYSICAL EXAM:  T(F): 99.7 (21 @ 19:57), Max: 101.4 (21 @ 13:22)  HR: 111 (21 @ 19:57) (99 - 112)  BP: 103/69 (21 @ 19:57) (103/69 - 137/85)  RR: 16 (21 @ 19:57) (16 - 18)  SpO2: 99% (21 @ 19:57) (99% - 100%)    GENERAL: NAD, well-groomed, well-developed  HEAD:  Atraumatic, Normocephalic  EYES: EOMI, PERRL, conjunctiva and sclera clear  ENMT: No tonsillar erythema, exudates, or enlargement; Moist mucous membranes  NECK: Supple, No JVD, Normal thyroid  NERVOUS SYSTEM:  Alert & Oriented X3, Moves all extremities, Sensation to light touch intact  CHEST/LUNG: Clear to auscultation bilaterally; No wheezes, rales or rhonchi  HEART: RRR, S1, S2; No murmurs, rubs, or gallops  ABDOMEN: Soft, Nontender, Nondistended; Bowel sounds present  EXTREMITIES:  2+ Peripheral Pulses, No clubbing, cyanosis, or edema  LYMPH: No lymphadenopathy noted  SKIN: No rashes or lesions  VAGINAL EXAM: deferred, as performed by GYN provider    Care Discussed with Consultants/Other Providers [x] YES  [ ] NO Patient is a 41y old  Female who presents with a chief complaint of fever (17 Aug 2021 23:03)    HPI:  Cc: sent from PST due to fever 101F     HPI: 41y [id number] sent from [facility] due to fever. Patient is scheduled for Hysteroscopy, D&C, IUD removal and was at the hospital doing her PST. She had an unsuccessful attempt at removing a Mirena IUD at the office on 2021. The IUD was not clearly seen on imaging, including an MRI. However, Dr. Faye saw it on US that day.   Of note, the patient has been experiencing heavy menstrual bleeding for the past few months. Pelvic US at the office showed her myomas have increased in size. An endometrial biopsy was recommended but has not been performed as of yet. A sample was to be taken during her upcoming procedure.   A Mirena IUD has been in place since 2018.   (17 Aug 2021 23:03)    Interval HPI: Medicine was consulted for further work up given unimpressive pelvic exam by GYN.  40 y/o F with PMHx of Non-Hodgkins lymphoma, Myomas, abnormal PAP smears presented to the ED from [facility] for fever. She had an unsuccessful attempt of IUD removal in the office. She has heavy vaginal bleeding for the past few months. She was scheduled for Dilation and Curettage Hysteroscopy with IUD Removal on 21 with Dr Faye. Seen by GYN here d/t CT findings of necrotic myomas and abnormal endometrial cavity, possibly related to recent instrumentation in IUD removal attempt. Being treated for presumed PID, however with unimpressive pelvic exam by GYN. Patient admits to fever and chills. Denies chest pain, palpitations, dyspnea, headache, dizziness, abdominal pain, n/v/d.    I&O's Summary      PAST MEDICAL & SURGICAL HISTORY:  Cholecystitis    Non Hodgkin&#x27;s lymphoma    Retained intrauterine contraceptive device in pregnancy, unspecified trimester  for heavy bleeding and uterine fibroids    Encounter for insertion of venous access port  removed 29 years ago    S/P cholecystectomy  2019    Ob Hx: FT  x 2, PT  of IUFD at 23 wks.    Gyn Hx: Myomas, Hx abnormal Pap smears      Allergies    No Known Allergies    Intolerances        SOCIAL HISTORY  , lives with spouse  Alcohol: denies  Tobacco: denies  Illicit substance use: denies  Occupation:     FAMILY HISTORY:  FH: prostate cancer    FH: hypertension        Home Medications:  Mirena IUD      LABS:                        8.9    18.89 )-----------( 413      ( 17 Aug 2021 16:22 )             27.5         137  |  105  |  4<L>  ----------------------------<  100<H>  3.5   |  28  |  0.49<L>    Ca    8.7      17 Aug 2021 16:22    TPro  7.5  /  Alb  2.8<L>  /  TBili  0.7  /  DBili  x   /  AST  39<H>  /  ALT  56  /  AlkPhos  106      PT/INR - ( 17 Aug 2021 16:22 )   PT: 15.4 sec;   INR: 1.33 ratio         PTT - ( 17 Aug 2021 16:22 )  PTT:26.8 sec  Urinalysis Basic - ( 17 Aug 2021 16:44 )    Color: Yellow / Appearance: Clear / S.005 / pH: x  Gluc: x / Ketone: Moderate  / Bili: Negative / Urobili: 1   Blood: x / Protein: 30 mg/dL / Nitrite: Negative   Leuk Esterase: Small / RBC: 25-50 /HPF / WBC 3-5   Sq Epi: x / Non Sq Epi: Occasional / Bacteria: Few      CAPILLARY BLOOD GLUCOSE            Urinalysis Basic - ( 17 Aug 2021 16:44 )    Color: Yellow / Appearance: Clear / S.005 / pH: x  Gluc: x / Ketone: Moderate  / Bili: Negative / Urobili: 1   Blood: x / Protein: 30 mg/dL / Nitrite: Negative   Leuk Esterase: Small / RBC: 25-50 /HPF / WBC 3-5   Sq Epi: x / Non Sq Epi: Occasional / Bacteria: Few        MEDICATIONS  (STANDING):  cefoTEtan  IVPB 2 Gram(s) IV Intermittent every 12 hours  docusate sodium 100 milliGRAM(s) Oral two times a day  doxycycline IVPB 100 milliGRAM(s) IV Intermittent every 12 hours  ferrous    sulfate 325 milliGRAM(s) Oral three times a day  lactated ringers. 1000 milliLiter(s) (75 mL/Hr) IV Continuous <Continuous>  tranexamic  acid 1300 milliGRAM(s) Oral three times a day    MEDICATIONS  (PRN):  acetaminophen   Tablet .. 650 milliGRAM(s) Oral every 6 hours PRN Temp greater or equal to 38C (100.4F)  ibuprofen  Tablet. 600 milliGRAM(s) Oral every 6 hours PRN Mild Pain (1 - 3), Moderate Pain (4 - 6), Severe Pain (7 - 10)      REVIEW OF SYSTEMS:  CONSTITUTIONAL: admits fevers and chills  HEENT: denies blurred vision, sore throat  SKIN: denies new lesions, rash  CARDIOVASCULAR: denies chest pain, chest pressure, palpitations  RESPIRATORY: denies shortness of breath, sputum production  GASTROINTESTINAL: denies nausea, vomiting, diarrhea, abdominal pain  GENITOURINARY: admits heavy menses  NEUROLOGICAL: denies numbness, headache, focal weakness  MUSCULOSKELETAL: denies new joint pain, muscle aches  HEMATOLOGIC: denies gross bleeding, bruising  LYMPHATICS: denies enlarged lymph nodes, extremity swelling  PSYCHIATRIC: denies recent changes in anxiety, depression  ENDOCRINOLOGIC: denies sweating, cold or heat intolerance    RADIOLOGY & ADDITIONAL TESTS:    < from: CT Abdomen and Pelvis w/ IV Cont (21 @ 17:14) >    EXAM:  CT ABDOMEN AND PELVIS IC                            *** ADDENDUM 2021  ***    Findings discussed with Dr. Alcaraz at 2021 6:50 PM with readback.    --- End of Report ---    *** END OF ADDENDUM 2021  ***      PROCEDURE DATE:  2021          INTERPRETATION:  CLINICAL INFORMATION: 41 years  Female with RLQ pain, fever, vaginal bleeding. History of recent IUD removal attempts.    COMPARISON: None.    CONTRAST/COMPLICATIONS:  IV Contrast: Omnipaque 350  90 cc administered 10 cc discarded  Oral Contrast: NONE  Complications: None reported at time of study completion    PROCEDURE:  CT of the Abdomen and Pelvis was performed.  Sagittal and coronal reformats were performed.    FINDINGS:  LOWER CHEST: Within normal limits.    LIVER: Within normal limits.  BILE DUCTS: Normal caliber.  GALLBLADDER: Within normal limits.  SPLEEN: Within normal limits.  PANCREAS: Within normal limits.  ADRENALS: Within normal limits.  KIDNEYS/URETERS: Within normal limits.    BLADDER: Within normal limits.  REPRODUCTIVE ORGANS: Enlarged uterus 17.2 cm sagittal by 9.7 cm AP by 17.1 cm transverse. 9.5 x 6.7 x 8.2 cm right-sided fundal necrotic myoma. 5.8 x 6.3 x 5.8 cm left mid body necrotic myoma containing small debris. 1.7 cm calcified fundal myoma. Crescentic fluid left side of the uterus tracking anteriorly may reflect partially obstructing endometrial cavity or hydrosalpinx. Unremarkable right ovary. Left ovary not visualized. No IUD identified. Punctate hypodensity in the endometrial cavity (2:74). Fluid in the endometrial cavity.    BOWEL: No bowel obstruction. Appendix is normal.  PERITONEUM: Minimal right pelvic ascites.  VESSELS: Within normal limits.  RETROPERITONEUM/LYMPH NODES: No lymphadenopathy.  ABDOMINAL WALL: Within normal limits.  BONES: Within normal limits.    IMPRESSION:  2 dominant necrotic myomata. The left contains small debris. Correlate clinically for superimposed infection.    Crescentic fluid along the left side of the uterus may represent partially obstructed endometrium or hydrosalpinx. Small right pelvic ascites.    Punctate high attenuation in the endometrial cavity may represent calcification or may be related to recent IUD removal attempt. No IUD is identified.    Recommend MRI for additional characterization.        --- End of Report ---    ***Please see the addendum at the top of this report. It may contain additional important information or changes.****        ECTOR MAY MD; Attending Radiologist  This document has been electronically signed. Aug 17 2021  5:35PM  Addend:ECTOR MAY MD; Attending Radiologist  This addendum was electronically signed on: Aug 17 2021  6:51PM.    < end of copied text >  < from: US Pelvis Complete (21 @ 16:44) >    EXAM:  US PELVIC COMPLETE                            PROCEDURE DATE:  2021          INTERPRETATION:  CLINICAL INFORMATION: Fever. Right lower quadrant pain.    LMP: Unknown.    COMPARISON: None available.    TECHNIQUE:  Transabdominal pelvic sonogram only. Patient declined endovaginal imaging.    FINDINGS:    Uterus: 16.8 x 9.4 x 13.7 cm. Multiple leiomyomas, measuring up to 7.8 x 7.0 x 7.3 cm.  Endometrium: 1.3 cm.    Right ovary: Not visualized.  Left ovary: Not visualized.    Fluid: None.    IMPRESSION:  Multiple uterine leiomyomas.    Ovaries are not visualized.    --- End of Report ---    JENNIFER MUKHERJEE MD; Attending Radiologist  This document has been electronically signed. Aug 17 2021  4:47PM    < end of copied text >    Imaging Personally Reviewed:  [x] YES  [ ] NO    Consultant(s) Notes Reviewed:  [x] YES  [ ] NO        PHYSICAL EXAM:  T(F): 99.7 (21 @ 19:57), Max: 101.4 (21 @ 13:22)  HR: 111 (21 @ 19:57) (99 - 112)  BP: 103/69 (21 @ 19:57) (103/69 - 137/85)  RR: 16 (21 @ 19:57) (16 - 18)  SpO2: 99% (21 @ 19:57) (99% - 100%)    GENERAL: NAD, well-groomed, well-developed  HEAD:  Atraumatic, Normocephalic  EYES: EOMI, PERRL, conjunctiva and sclera clear  ENMT: No tonsillar erythema, exudates, or enlargement; Moist mucous membranes  NECK: Supple, No JVD, Normal thyroid  NERVOUS SYSTEM:  Alert & Oriented X3, Moves all extremities, Sensation to light touch intact  CHEST/LUNG: Clear to auscultation bilaterally; No wheezes, rales or rhonchi  HEART: RRR, S1, S2; No murmurs, rubs, or gallops  ABDOMEN: Soft, Nontender, Nondistended; Bowel sounds present  EXTREMITIES:  2+ Peripheral Pulses, No clubbing, cyanosis, or edema  LYMPH: No lymphadenopathy noted  SKIN: No rashes or lesions  VAGINAL EXAM: deferred, as performed by GYN provider    Care Discussed with Consultants/Other Providers [x] YES  [ ] NO Patient is a 41y old  Female who presents with a chief complaint of fever (17 Aug 2021 23:03)    HPI:  Cc: sent from PST due to fever 101F     HPI: 41y [id number] [facility]. Patient is scheduled for Hysteroscopy, D&C, IUD removal and was at the hospital doing her PST. She had an unsuccessful attempt at removing a Mirena IUD at the office on 2021. The IUD was not clearly seen on imaging, including an MRI. However, Dr. Faye saw it on US that day.   Patient reports having long but lighter menses with spotting in between. Heavy bleeding started , soaking 5-6 pads per day with large clots. Pelvic US at the office showed her myomas have increased in size. An endometrial biopsy was recommended but has not been performed as of yet. A sample was to be taken during her upcoming procedure.   A Mirena IUD has been in place since 2018.    (17 Aug 2021 23:03)    Interval HPI: Medicine was consulted for further work up given unimpressive pelvic exam by GYN.  40 y/o F with PMHx of Non-Hodgkins lymphoma, Myomas, abnormal PAP smears presented to the ED from [facility] for fever. She had an unsuccessful attempt of IUD removal in the office. She has heavy vaginal bleeding for the past few months. She was scheduled for Dilation and Curettage Hysteroscopy with IUD Removal on 21 with Dr Faye. Seen by GYN here d/t CT findings of necrotic myomas and abnormal endometrial cavity, possibly related to recent instrumentation in IUD removal attempt. Being treated for presumed PID, however with unimpressive pelvic exam by GYN. Patient admits to chills, subjective fevers and RLQ constant pain. Denies chest pain, palpitations, dyspnea, headache, dizziness, n/v/d.    I&O's Summary      PAST MEDICAL & SURGICAL HISTORY:  Cholecystitis    Non Hodgkin&#x27;s lymphoma, treated with chemotherapy    Retained intrauterine contraceptive device in pregnancy, unspecified trimester  for heavy bleeding and uterine fibroids    Encounter for insertion of venous access port  removed 29 years ago    S/P cholecystectomy      Ob Hx: FT  x 2, PT  of IUFD at 23 wks.    Gyn Hx: Myomas, Hx abnormal Pap smears      Allergies    No Known Allergies    Intolerances        SOCIAL HISTORY  , lives with spouse  Alcohol: denies  Tobacco: denies  Illicit substance use: denies  Occupation:     FAMILY HISTORY:  FH: prostate cancer    FH: hypertension, Type 2 DM         Home Medications:  Mirena IUD      LABS:                        8.9    18.89 )-----------( 413      ( 17 Aug 2021 16:22 )             27.5     08    137  |  105  |  4<L>  ----------------------------<  100<H>  3.5   |  28  |  0.49<L>    Ca    8.7      17 Aug 2021 16:22    TPro  7.5  /  Alb  2.8<L>  /  TBili  0.7  /  DBili  x   /  AST  39<H>  /  ALT  56  /  AlkPhos  106      PT/INR - ( 17 Aug 2021 16:22 )   PT: 15.4 sec;   INR: 1.33 ratio         PTT - ( 17 Aug 2021 16:22 )  PTT:26.8 sec  Urinalysis Basic - ( 17 Aug 2021 16:44 )    Color: Yellow / Appearance: Clear / S.005 / pH: x  Gluc: x / Ketone: Moderate  / Bili: Negative / Urobili: 1   Blood: x / Protein: 30 mg/dL / Nitrite: Negative   Leuk Esterase: Small / RBC: 25-50 /HPF / WBC 3-5   Sq Epi: x / Non Sq Epi: Occasional / Bacteria: Few      CAPILLARY BLOOD GLUCOSE            Urinalysis Basic - ( 17 Aug 2021 16:44 )    Color: Yellow / Appearance: Clear / S.005 / pH: x  Gluc: x / Ketone: Moderate  / Bili: Negative / Urobili: 1   Blood: x / Protein: 30 mg/dL / Nitrite: Negative   Leuk Esterase: Small / RBC: 25-50 /HPF / WBC 3-5   Sq Epi: x / Non Sq Epi: Occasional / Bacteria: Few        MEDICATIONS  (STANDING):  cefoTEtan  IVPB 2 Gram(s) IV Intermittent every 12 hours  docusate sodium 100 milliGRAM(s) Oral two times a day  doxycycline IVPB 100 milliGRAM(s) IV Intermittent every 12 hours  ferrous    sulfate 325 milliGRAM(s) Oral three times a day  lactated ringers. 1000 milliLiter(s) (75 mL/Hr) IV Continuous <Continuous>  tranexamic  acid 1300 milliGRAM(s) Oral three times a day    MEDICATIONS  (PRN):  acetaminophen   Tablet .. 650 milliGRAM(s) Oral every 6 hours PRN Temp greater or equal to 38C (100.4F)  ibuprofen  Tablet. 600 milliGRAM(s) Oral every 6 hours PRN Mild Pain (1 - 3), Moderate Pain (4 - 6), Severe Pain (7 - 10)      REVIEW OF SYSTEMS:  CONSTITUTIONAL: admits fevers and chills  HEENT: denies blurred vision, sore throat  SKIN: denies new lesions, rash  CARDIOVASCULAR: denies chest pain, chest pressure, palpitations  RESPIRATORY: denies shortness of breath, sputum production  GASTROINTESTINAL: denies nausea, vomiting, diarrhea, abdominal pain  GENITOURINARY: admits heavy menses  NEUROLOGICAL: denies numbness, headache, focal weakness  MUSCULOSKELETAL: denies new joint pain, muscle aches  HEMATOLOGIC: denies gross bleeding, bruising  LYMPHATICS: denies enlarged lymph nodes, extremity swelling  PSYCHIATRIC: denies recent changes in anxiety, depression  ENDOCRINOLOGIC: denies sweating, cold or heat intolerance    RADIOLOGY & ADDITIONAL TESTS:    < from: CT Abdomen and Pelvis w/ IV Cont (21 @ 17:14) >    EXAM:  CT ABDOMEN AND PELVIS IC                            *** ADDENDUM 2021  ***    Findings discussed with Dr. Alcaraz at 2021 6:50 PM with readback.    --- End of Report ---    *** END OF ADDENDUM 2021  ***      PROCEDURE DATE:  2021          INTERPRETATION:  CLINICAL INFORMATION: 41 years  Female with RLQ pain, fever, vaginal bleeding. History of recent IUD removal attempts.    COMPARISON: None.    CONTRAST/COMPLICATIONS:  IV Contrast: Omnipaque 350  90 cc administered 10 cc discarded  Oral Contrast: NONE  Complications: None reported at time of study completion    PROCEDURE:  CT of the Abdomen and Pelvis was performed.  Sagittal and coronal reformats were performed.    FINDINGS:  LOWER CHEST: Within normal limits.    LIVER: Within normal limits.  BILE DUCTS: Normal caliber.  GALLBLADDER: Within normal limits.  SPLEEN: Within normal limits.  PANCREAS: Within normal limits.  ADRENALS: Within normal limits.  KIDNEYS/URETERS: Within normal limits.    BLADDER: Within normal limits.  REPRODUCTIVE ORGANS: Enlarged uterus 17.2 cm sagittal by 9.7 cm AP by 17.1 cm transverse. 9.5 x 6.7 x 8.2 cm right-sided fundal necrotic myoma. 5.8 x 6.3 x 5.8 cm left mid body necrotic myoma containing small debris. 1.7 cm calcified fundal myoma. Crescentic fluid left side of the uterus tracking anteriorly may reflect partially obstructing endometrial cavity or hydrosalpinx. Unremarkable right ovary. Left ovary not visualized. No IUD identified. Punctate hypodensity in the endometrial cavity (2:74). Fluid in the endometrial cavity.    BOWEL: No bowel obstruction. Appendix is normal.  PERITONEUM: Minimal right pelvic ascites.  VESSELS: Within normal limits.  RETROPERITONEUM/LYMPH NODES: No lymphadenopathy.  ABDOMINAL WALL: Within normal limits.  BONES: Within normal limits.    IMPRESSION:  2 dominant necrotic myomata. The left contains small debris. Correlate clinically for superimposed infection.    Crescentic fluid along the left side of the uterus may represent partially obstructed endometrium or hydrosalpinx. Small right pelvic ascites.    Punctate high attenuation in the endometrial cavity may represent calcification or may be related to recent IUD removal attempt. No IUD is identified.    Recommend MRI for additional characterization.        --- End of Report ---    ***Please see the addendum at the top of this report. It may contain additional important information or changes.****        ECTOR MAY MD; Attending Radiologist  This document has been electronically signed. Aug 17 2021  5:35PM  Addend:ECTOR MAY MD; Attending Radiologist  This addendum was electronically signed on: Aug 17 2021  6:51PM.    < end of copied text >  < from: US Pelvis Complete (21 @ 16:44) >    EXAM:  US PELVIC COMPLETE                            PROCEDURE DATE:  2021          INTERPRETATION:  CLINICAL INFORMATION: Fever. Right lower quadrant pain.    LMP: Unknown.    COMPARISON: None available.    TECHNIQUE:  Transabdominal pelvic sonogram only. Patient declined endovaginal imaging.    FINDINGS:    Uterus: 16.8 x 9.4 x 13.7 cm. Multiple leiomyomas, measuring up to 7.8 x 7.0 x 7.3 cm.  Endometrium: 1.3 cm.    Right ovary: Not visualized.  Left ovary: Not visualized.    Fluid: None.    IMPRESSION:  Multiple uterine leiomyomas.    Ovaries are not visualized.    --- End of Report ---    JENNIFER MUKHERJEE MD; Attending Radiologist  This document has been electronically signed. Aug 17 2021  4:47PM    < end of copied text >    Imaging Personally Reviewed:  [x] YES  [ ] NO    Consultant(s) Notes Reviewed:  [x] YES  [ ] NO        PHYSICAL EXAM:  T(F): 99.7 (21 @ 19:57), Max: 101.4 (21 @ 13:22)  HR: 111 (21 @ 19:57) (99 - 112)  BP: 103/69 (21 @ 19:57) (103/69 - 137/85)  RR: 16 (21 @ 19:57) (16 - 18)  SpO2: 99% (21 @ 19:57) (99% - 100%)    GENERAL: NAD, well-groomed, well-developed  HEAD:  Atraumatic, Normocephalic  EYES: EOMI, PERRL, conjunctiva and sclera clear  ENMT: No tonsillar erythema, exudates, or enlargement; Moist mucous membranes  NECK: Supple, No JVD, Normal thyroid  NERVOUS SYSTEM:  Alert & Oriented X3, Moves all extremities, Sensation to light touch intact  CHEST/LUNG: Clear to auscultation bilaterally; No wheezes, rales or rhonchi  HEART: RRR, S1, S2; No murmurs, rubs, or gallops  ABDOMEN: Soft, Nontender, Nondistended; Bowel sounds present  EXTREMITIES:  2+ Peripheral Pulses, No clubbing, cyanosis, or edema  LYMPH: No lymphadenopathy noted  SKIN: No rashes or lesions  VAGINAL EXAM: deferred, as performed by GYN provider    Care Discussed with Consultants/Other Providers [x] YES  [ ] NO Patient is a 41y old  Female who presents with a chief complaint of fever (17 Aug 2021 23:03)    HPI:  Cc: sent from PST due to fever 101F     HPI: 41y [id number] sent from [facility] due to fever. Patient is scheduled for Hysteroscopy, D&C, IUD removal and was at the hospital doing her PST. She had an unsuccessful attempt at removing a Mirena IUD at the office on 2021. The IUD was not clearly seen on imaging, including an MRI. However, Dr. Faye saw it on US that day.   Patient reports having long but lighter menses with spotting in between. Heavy bleeding started , soaking 5-6 pads per day with large clots. Pelvic US at the office showed her myomas have increased in size. An endometrial biopsy was recommended but has not been performed as of yet. A sample was to be taken during her upcoming procedure.   A Mirena IUD has been in place since 2018.    (17 Aug 2021 23:03)    Interval HPI: Medicine was consulted for further work up given unimpressive pelvic exam by GYN.  42 y/o F with PMHx of Non-Hodgkins lymphoma, Myomas, abnormal PAP smears presented to the ED from [facility] for fever. She had an unsuccessful attempt of IUD removal in the office. She has heavy vaginal bleeding for the past few months. She was scheduled for Dilation and Curettage Hysteroscopy with IUD Removal on 21 with Dr Faye. Seen by GYN here d/t CT findings of necrotic myomas and abnormal endometrial cavity, possibly related to recent instrumentation in IUD removal attempt. Being treated for presumed PID, however with unimpressive pelvic exam by GYN. Patient admits to chills, subjective fevers and RLQ constant pain. Denies chest pain, palpitations, dyspnea, headache, dizziness, n/v/d.    I&O's Summary      PAST MEDICAL & SURGICAL HISTORY:  Cholecystitis    Non Hodgkin&#x27;s lymphoma, treated with chemotherapy    Retained intrauterine contraceptive device in pregnancy, unspecified trimester  for heavy bleeding and uterine fibroids    Encounter for insertion of venous access port  removed 29 years ago    S/P cholecystectomy      Ob Hx: FT  x 2, PT  of IUFD at 23 wks.    Gyn Hx: Myomas, Hx abnormal Pap smears      Allergies    No Known Allergies    Intolerances        SOCIAL HISTORY  , lives with spouse  Alcohol: denies  Tobacco: denies  Illicit substance use: denies  Occupation:     FAMILY HISTORY:  FH: prostate cancer    FH: hypertension, Type 2 DM         Home Medications:  Mirena IUD      LABS:                        8.9    18.89 )-----------( 413      ( 17 Aug 2021 16:22 )             27.5     08    137  |  105  |  4<L>  ----------------------------<  100<H>  3.5   |  28  |  0.49<L>    Ca    8.7      17 Aug 2021 16:22    TPro  7.5  /  Alb  2.8<L>  /  TBili  0.7  /  DBili  x   /  AST  39<H>  /  ALT  56  /  AlkPhos  106      PT/INR - ( 17 Aug 2021 16:22 )   PT: 15.4 sec;   INR: 1.33 ratio         PTT - ( 17 Aug 2021 16:22 )  PTT:26.8 sec  Urinalysis Basic - ( 17 Aug 2021 16:44 )    Color: Yellow / Appearance: Clear / S.005 / pH: x  Gluc: x / Ketone: Moderate  / Bili: Negative / Urobili: 1   Blood: x / Protein: 30 mg/dL / Nitrite: Negative   Leuk Esterase: Small / RBC: 25-50 /HPF / WBC 3-5   Sq Epi: x / Non Sq Epi: Occasional / Bacteria: Few      CAPILLARY BLOOD GLUCOSE            Urinalysis Basic - ( 17 Aug 2021 16:44 )    Color: Yellow / Appearance: Clear / S.005 / pH: x  Gluc: x / Ketone: Moderate  / Bili: Negative / Urobili: 1   Blood: x / Protein: 30 mg/dL / Nitrite: Negative   Leuk Esterase: Small / RBC: 25-50 /HPF / WBC 3-5   Sq Epi: x / Non Sq Epi: Occasional / Bacteria: Few        MEDICATIONS  (STANDING):  cefoTEtan  IVPB 2 Gram(s) IV Intermittent every 12 hours  docusate sodium 100 milliGRAM(s) Oral two times a day  doxycycline IVPB 100 milliGRAM(s) IV Intermittent every 12 hours  ferrous    sulfate 325 milliGRAM(s) Oral three times a day  lactated ringers. 1000 milliLiter(s) (75 mL/Hr) IV Continuous <Continuous>  tranexamic  acid 1300 milliGRAM(s) Oral three times a day    MEDICATIONS  (PRN):  acetaminophen   Tablet .. 650 milliGRAM(s) Oral every 6 hours PRN Temp greater or equal to 38C (100.4F)  ibuprofen  Tablet. 600 milliGRAM(s) Oral every 6 hours PRN Mild Pain (1 - 3), Moderate Pain (4 - 6), Severe Pain (7 - 10)      REVIEW OF SYSTEMS:  CONSTITUTIONAL: admits fevers and chills  HEENT: denies blurred vision, sore throat  SKIN: denies new lesions, rash  CARDIOVASCULAR: denies chest pain, chest pressure, palpitations  RESPIRATORY: denies shortness of breath, sputum production  GASTROINTESTINAL: denies nausea, vomiting, diarrhea; admits RLQ abdominal pain  GENITOURINARY: admits heavy menses  NEUROLOGICAL: denies numbness, headache, focal weakness  MUSCULOSKELETAL: denies new joint pain, muscle aches  HEMATOLOGIC: denies gross bleeding, bruising  LYMPHATICS: denies enlarged lymph nodes, extremity swelling  PSYCHIATRIC: denies recent changes in anxiety, depression  ENDOCRINOLOGIC: denies sweating, cold or heat intolerance    RADIOLOGY & ADDITIONAL TESTS:    < from: CT Abdomen and Pelvis w/ IV Cont (21 @ 17:14) >    EXAM:  CT ABDOMEN AND PELVIS IC                            *** ADDENDUM 2021  ***    Findings discussed with Dr. Alcaraz at 2021 6:50 PM with readback.    --- End of Report ---    *** END OF ADDENDUM 2021  ***      PROCEDURE DATE:  2021          INTERPRETATION:  CLINICAL INFORMATION: 41 years  Female with RLQ pain, fever, vaginal bleeding. History of recent IUD removal attempts.    COMPARISON: None.    CONTRAST/COMPLICATIONS:  IV Contrast: Omnipaque 350  90 cc administered 10 cc discarded  Oral Contrast: NONE  Complications: None reported at time of study completion    PROCEDURE:  CT of the Abdomen and Pelvis was performed.  Sagittal and coronal reformats were performed.    FINDINGS:  LOWER CHEST: Within normal limits.    LIVER: Within normal limits.  BILE DUCTS: Normal caliber.  GALLBLADDER: Within normal limits.  SPLEEN: Within normal limits.  PANCREAS: Within normal limits.  ADRENALS: Within normal limits.  KIDNEYS/URETERS: Within normal limits.    BLADDER: Within normal limits.  REPRODUCTIVE ORGANS: Enlarged uterus 17.2 cm sagittal by 9.7 cm AP by 17.1 cm transverse. 9.5 x 6.7 x 8.2 cm right-sided fundal necrotic myoma. 5.8 x 6.3 x 5.8 cm left mid body necrotic myoma containing small debris. 1.7 cm calcified fundal myoma. Crescentic fluid left side of the uterus tracking anteriorly may reflect partially obstructing endometrial cavity or hydrosalpinx. Unremarkable right ovary. Left ovary not visualized. No IUD identified. Punctate hypodensity in the endometrial cavity (2:74). Fluid in the endometrial cavity.    BOWEL: No bowel obstruction. Appendix is normal.  PERITONEUM: Minimal right pelvic ascites.  VESSELS: Within normal limits.  RETROPERITONEUM/LYMPH NODES: No lymphadenopathy.  ABDOMINAL WALL: Within normal limits.  BONES: Within normal limits.    IMPRESSION:  2 dominant necrotic myomata. The left contains small debris. Correlate clinically for superimposed infection.    Crescentic fluid along the left side of the uterus may represent partially obstructed endometrium or hydrosalpinx. Small right pelvic ascites.    Punctate high attenuation in the endometrial cavity may represent calcification or may be related to recent IUD removal attempt. No IUD is identified.    Recommend MRI for additional characterization.        --- End of Report ---    ***Please see the addendum at the top of this report. It may contain additional important information or changes.****        ECTOR MAY MD; Attending Radiologist  This document has been electronically signed. Aug 17 2021  5:35PM  Addend:ECTOR MAY MD; Attending Radiologist  This addendum was electronically signed on: Aug 17 2021  6:51PM.    < end of copied text >  < from: US Pelvis Complete (21 @ 16:44) >    EXAM:  US PELVIC COMPLETE                            PROCEDURE DATE:  2021          INTERPRETATION:  CLINICAL INFORMATION: Fever. Right lower quadrant pain.    LMP: Unknown.    COMPARISON: None available.    TECHNIQUE:  Transabdominal pelvic sonogram only. Patient declined endovaginal imaging.    FINDINGS:    Uterus: 16.8 x 9.4 x 13.7 cm. Multiple leiomyomas, measuring up to 7.8 x 7.0 x 7.3 cm.  Endometrium: 1.3 cm.    Right ovary: Not visualized.  Left ovary: Not visualized.    Fluid: None.    IMPRESSION:  Multiple uterine leiomyomas.    Ovaries are not visualized.    --- End of Report ---    JENNIFER MUKHERJEE MD; Attending Radiologist  This document has been electronically signed. Aug 17 2021  4:47PM    < end of copied text >    Imaging Personally Reviewed:  [x] YES  [ ] NO    Consultant(s) Notes Reviewed:  [x] YES  [ ] NO        PHYSICAL EXAM:  T(F): 99.7 (21 @ 19:57), Max: 101.4 (21 @ 13:22)  HR: 111 (21 @ 19:57) (99 - 112)  BP: 103/69 (21 @ 19:57) (103/69 - 137/85)  RR: 16 (21 @ 19:57) (16 - 18)  SpO2: 99% (21 @ 19:57) (99% - 100%)    GENERAL: NAD, well-groomed, well-developed  HEAD:  Atraumatic, Normocephalic  EYES: EOMI, PERRL, conjunctiva and sclera clear  ENMT: No tonsillar erythema, exudates, or enlargement; Moist mucous membranes  NECK: Supple, No JVD, Normal thyroid  NERVOUS SYSTEM:  Alert & Oriented X3, Moves all extremities, Sensation to light touch intact  CHEST/LUNG: Clear to auscultation bilaterally; No wheezes, rales or rhonchi  HEART: RRR, S1, S2; No murmurs, rubs, or gallops  ABDOMEN: Soft, nontender, Nondistended; Bowel sounds present  EXTREMITIES:  2+ Peripheral Pulses, No clubbing, cyanosis, or edema  LYMPH: No lymphadenopathy noted  SKIN: No rashes or lesions  VAGINAL EXAM: deferred, as performed by GYN provider    Care Discussed with Consultants/Other Providers [x] YES  [ ] NO

## 2021-08-18 NOTE — CONSULT NOTE ADULT - SUBJECTIVE AND OBJECTIVE BOX
[facility] DIVISION of INFECTIOUS DISEASE  Jorge Herman MD PhD, Bridget Renee MD, Vernell Doe MD, Jackelyn Peace MD  and providing coverage with Molly Tamayo MD and Shun Eaton MD  Providing Infectious Disease Consultations at [facility], [facility], [facility]'s    Office# [phone] to schedule follow up appointments  Answering Service for urgent calls or New Consults [phone]  Cell# to text for urgent issues Jorge Herman [phone]     HPI:  Cc: sent from PST due to fever 101F     HPI: 41y [id number] sent from PSTs due to fever. Patient is scheduled for Hysteroscopy, D&C, IUD removal and was at the hospital doing her PST. She had an unsuccessful attempt at removing a Mirena IUD at the office on 2021. The IUD was not clearly seen on imaging, including an MRI. However, Dr. Faye saw it on US that day.   Of note, the patient has been experiencing heavy menstrual bleeding for the past few months. Pelvic US at the office showed her myomas have increased in size. An endometrial biopsy was recommended but has not been performed as of yet. A sample was to be taken during her upcoming procedure.   A Mirena IUD has been in place since 2018.      PAST MEDICAL & SURGICAL HISTORY:  Cholecystitis    Non Hodgkin&#x27;s lymphoma    Retained intrauterine contraceptive device in pregnancy, unspecified trimester  for heavy bleeding and uterine fibroids    H/O fever      Encounter for insertion of venous access port  removed 29 years ago    S/P cholecystectomy          Antimicrobials  cefoTEtan  IVPB 2 Gram(s) IV Intermittent every 12 hours  doxycycline IVPB 100 milliGRAM(s) IV Intermittent every 12 hours      Immunological      Other  acetaminophen   Tablet .. 650 milliGRAM(s) Oral every 6 hours PRN  docusate sodium 100 milliGRAM(s) Oral two times a day  ferrous    sulfate 325 milliGRAM(s) Oral three times a day  ibuprofen  Tablet. 600 milliGRAM(s) Oral every 6 hours PRN  lactated ringers. 1000 milliLiter(s) IV Continuous <Continuous>  tranexamic  acid 1300 milliGRAM(s) Oral three times a day      Allergies    No Known Allergies    Intolerances        SOCIAL HISTORY:  no toxic habits reported      FAMILY HISTORY:  FH: prostate cancer    FH: hypertension        ROS:    EYES:  Negative  blurry vision or double vision  GASTROINTESTINAL:  Negative for nausea, vomiting, diarrhea  -otherwise negative except for subjective    Vital Signs Last 24 Hrs  T(C): 38 (18 Aug 2021 11:20), Max: 38.6 (17 Aug 2021 13:22)  T(F): 100.4 (18 Aug 2021 11:20), Max: 101.4 (17 Aug 2021 13:22)  HR: 112 (18 Aug 2021 11:20) (74 - 112)  BP: 102/64 (18 Aug 2021 11:20) (102/64 - 137/85)  BP(mean): 82 (17 Aug 2021 13:22) (82 - 82)  RR: 16 (18 Aug 2021 11:20) (16 - 18)  SpO2: 97% (18 Aug 2021 11:20) (97% - 100%)    PE:  WDWN, obese in no distress  HEENT:  NC, PERRL, sclerae anicteric, conjunctivae clear, EOMI.  Sinuses nontender, no nasal exudate.  No buccal or pharyngeal lesions, erythema or exudate  Neck:  Supple, no adenopathy  Lungs:  No accessory muscle use, bilaterally clear to auscultation  Cor:  RRR, S1, S2, no murmur appreciated  Abd:  Symmetric, normoactive BS.  Soft, nontender, no masses, guarding or rebound.  Liver and spleen not enlarged  Extrem:  No cyanosis or edema  Skin:  No rashes.  Neuro: grossly intact  Musc: moving all limbs freely, no focal deficits        LABS:                        7.4    16.98 )-----------( 377      ( 18 Aug 2021 06:23 )             23.0       WBC Count: 16.98 K/uL (21 @ 06:23)  WBC Count: 18.89 K/uL (21 @ 16:22)  WBC Count: 19.59 K/uL (21 @ 14:00)          142  |  110<H>  |  4<L>  ----------------------------<  106<H>  3.6   |  26  |  0.41<L>    Ca    7.9<L>      18 Aug 2021 06:23    TPro  6.4  /  Alb  2.2<L>  /  TBili  0.6  /  DBili  x   /  AST  35  /  ALT  46  /  AlkPhos  89        Creatinine, Serum: 0.41 mg/dL (21 @ 06:23)  Creatinine, Serum: 0.49 mg/dL (21 @ 16:22)  Creatinine, Serum: 0.50 mg/dL (21 @ 14:00)      Urinalysis Basic - ( 17 Aug 2021 16:44 )    Color: Yellow / Appearance: Clear / S.005 / pH: x  Gluc: x / Ketone: Moderate  / Bili: Negative / Urobili: 1   Blood: x / Protein: 30 mg/dL / Nitrite: Negative   Leuk Esterase: Small / RBC: 25-50 /HPF / WBC 3-5   Sq Epi: x / Non Sq Epi: Occasional / Bacteria: Few              MICROBIOLOGY:      RADIOLOGY & ADDITIONAL STUDIES:    --< from: CT Abdomen and Pelvis w/ IV Cont (21 @ 17:14) >    EXAM:  CT ABDOMEN AND PELVIS IC                            *** ADDENDUM 2021  ***    Findings discussed with Dr. Alcaraz at 2021 6:50 PM with readback.    --- End of Report ---    *** END OF ADDENDUM 2021  ***      PROCEDURE DATE:  2021          INTERPRETATION:  CLINICAL INFORMATION: 41 years  Female with RLQ pain, fever, vaginal bleeding. History of recent IUD removal attempts.    COMPARISON: None.    CONTRAST/COMPLICATIONS:  IV Contrast: Omnipaque 350  90 cc administered 10 cc discarded  Oral Contrast: NONE  Complications: None reported at time of study completion    PROCEDURE:  CT of the Abdomen and Pelvis was performed.  Sagittal and coronal reformats were performed.    FINDINGS:  LOWER CHEST: Within normal limits.    LIVER: Within normal limits.  BILE DUCTS: Normal caliber.  GALLBLADDER: Within normal limits.  SPLEEN: Within normal limits.  PANCREAS: Within normal limits.  ADRENALS: Within normal limits.  KIDNEYS/URETERS: Within normal limits.    BLADDER: Within normal limits.  REPRODUCTIVE ORGANS: Enlarged uterus 17.2 cm sagittal by 9.7 cm AP by 17.1 cm transverse. 9.5 x 6.7 x 8.2 cm right-sided fundal necrotic myoma. 5.8 x 6.3 x 5.8 cm left mid body necrotic myoma containing small debris. 1.7 cm calcified fundal myoma. Crescentic fluid left side of the uterus tracking anteriorly may reflect partially obstructing endometrial cavity or hydrosalpinx. Unremarkable right ovary. Left ovary not visualized. No IUD identified. Punctate hypodensity in the endometrial cavity (2:74). Fluid in the endometrial cavity.    BOWEL: No bowel obstruction. Appendix is normal.  PERITONEUM: Minimal right pelvic ascites.  VESSELS: Within normal limits.  RETROPERITONEUM/LYMPH NODES: No lymphadenopathy.  ABDOMINAL WALL: Within normal limits.  BONES: Within normal limits.    IMPRESSION:  2 dominant necrotic myomata. The left contains small debris. Correlate clinically for superimposed infection.    Crescentic fluid along the left side of the uterus may represent partially obstructed endometrium or hydrosalpinx. Small right pelvic ascites.    Punctate high attenuation in the endometrial cavity may represent calcification or may be related to recent IUD removal attempt. No IUD is identified.    Recommend MRI for additional characterization.    < from: US Pelvis Complete (21 @ 16:44) >    EXAM:  US PELVIC COMPLETE                            PROCEDURE DATE:  2021          INTERPRETATION:  CLINICAL INFORMATION: Fever. Right lower quadrant pain.    LMP: Unknown.    COMPARISON: None available.    TECHNIQUE:  Transabdominal pelvic sonogram only. Patient declined endovaginal imaging.    FINDINGS:    Uterus: 16.8 x 9.4 x 13.7 cm. Multiple leiomyomas, measuring up to 7.8 x 7.0 x 7.3 cm.  Endometrium: 1.3 cm.    Right ovary: Not visualized.  Left ovary: Not visualized.    Fluid: None.    IMPRESSION:  Multiple uterine leiomyomas.    Ovaries are not visualized.

## 2021-08-18 NOTE — CONSULT NOTE ADULT - ASSESSMENT
40 y/o F with PMHx of Non-Hodgkins lymphoma, Myomas, abnormal PAP smears presented to the ED from PST for fever possible PID given recent instrumentation vs necrotic myomas seen on CT imaging.    #Fever  #Necrotic Myomas  - on cefotetan + doxycycline for presumed PID  - per GYN, had unimpressive pelvic exam  - for MRI Pelvis w/wo IV contrast in AM to eval for IUD location - f/u  - GYN follow up Dr. Mcintyre to eval for surgical intervention  - follow up blood and urine cultures  - ID consult Dr. Herman    #Anemia  #Heavy menses  - started on Fe supplementation  - will check iron studies to eval for IV venofer  - ordered for TXA in AM per GYN    #Need for prophylactic measure  - VTE ppx: SCDs (has vaginal bleeding and will be getting TXA) 42 y/o F with PMHx of Non-Hodgkins lymphoma, Myomas, abnormal PAP smears presented to the ED from PST for fever possible PID given recent instrumentation vs necrotic myomas seen on CT imaging.    #Fever  #Pelvic infection secondary to intrauterine contraception   #Necrotic Uterine Myoma with possible superimposed infection on left  - sepsis POA (fever, leukocytosis, tachycardia) likely d/t IUD vs necrotic myomas  - on cefotetan + doxycycline for presumed PID  - per GYN, had unimpressive pelvic exam  - for MRI Pelvis w/wo IV contrast in AM to eval for IUD location - f/u  - GYN follow up Dr. Mcintyre to eval for surgical intervention  - continue IV antibiotics and IV fluids as ordered  - follow up blood and urine cultures  - ID consult Dr. Herman for antibiotic management    #Anemia  #Heavy menses  - started on Fe supplementation  - will check iron studies to eval for IV venofer  - ordered for TXA in AM per GYN    #Need for prophylactic measure  - VTE ppx: SCDs (has vaginal bleeding and will be getting TXA)    Thank you for allowing us to participate in the care of this patient. Our team will continue to follow along with you. Further recommendations to follow pending the clinical course. 42 y/o F with PMHx of Non-Hodgkins lymphoma, Myomas, abnormal PAP smears presented to the ED from PST for fever possible PID given recent instrumentation vs necrotic myomas seen on CT imaging.    #Fever  #Pelvic infection/endometritis secondary to manipulation of intrauterine contraception causing degeneration of fibroids   #Necrotic Uterine Myoma with possible superimposed infection on left  - sepsis POA (fever, leukocytosis, tachycardia) likely d/t IUD vs necrotic myomas  - on cefotetan + doxycycline for presumed PID  - per GYN, had unimpressive pelvic exam  - for MRI Pelvis w/wo IV contrast in AM to eval for IUD location - f/u  - GYN follow up Dr. Mcintyre to eval for surgical intervention  - continue IV antibiotics and IV fluids as ordered  - follow up blood and urine cultures  - ID consult Dr. Herman for antibiotic management    #Anemia  #Heavy menses  - started on Fe supplementation  - will check iron studies to eval for IV venofer  - ordered for TXA in AM per GYN    #Need for prophylactic measure  - VTE ppx: SCDs (has vaginal bleeding and will be getting TXA)    Thank you for allowing us to participate in the care of this patient. Our team will continue to follow along with you. Further recommendations to follow pending the clinical course. 40 y/o F with PMHx of Non-Hodgkins lymphoma, Myomas, abnormal PAP smears presented to the ED from [facility] for fever admitted for sepsis secondary to possible pelvic infection/endometritis given recent instrumentation vs necrotic myomas seen on CT imaging.    #Fever  #Pelvic infection/endometritis secondary to manipulation of intrauterine contraception causing degeneration of fibroids   #Necrotic Uterine Myoma with possible superimposed infection on left  - sepsis POA (fever, leukocytosis, tachycardia) likely d/t IUD vs necrotic myomas  - on cefotetan + doxycycline for presumed PID  - per GYN, had unimpressive pelvic exam  - for MRI Pelvis w/wo IV contrast in AM to eval for IUD location - f/u  - GYN follow up Dr. Mcintyre to eval for surgical intervention  - continue IV antibiotics and IV fluids as ordered  - follow up blood and urine cultures  - ID consult Dr. Herman for antibiotic management    #Anemia  #Heavy menses  - started on Fe supplementation  - will check iron studies to eval for IV venofer  - ordered for TXA in AM per GYN    #Need for prophylactic measure  - VTE ppx: SCDs (has vaginal bleeding and will be getting TXA)    Thank you for allowing us to participate in the care of this patient. Our team will continue to follow along with you. Further recommendations to follow pending the clinical course.

## 2021-08-18 NOTE — CONSULT NOTE ADULT - ATTENDING COMMENTS
40 y/o F with PMHx of Non-Hodgkins lymphoma, Myomas, abnormal PAP smears presented to the ED from [facility] for fever admitted for sepsis secondary to possible pelvic infection/endometritis given recent instrumentation vs necrotic myomas seen on CT imaging.    admit to GYN  sepsis POA secondary to pelvic infection/endometritis  continue IV antibiotics and IV fluids  follow up all culture data, monitor for fevers  ID consult for antibiotic management  will check iron studies to evaluate for IV venofer  d/w GYN Dr. Mcintyre    Agree with H&P as outlined above, edited where appropriate.

## 2021-08-18 NOTE — ED ADULT NURSE REASSESSMENT NOTE - NS ED NURSE REASSESS COMMENT FT1
pt aox4, back from MRI, no sob, ambulatory to bathroom, voiding, tolerating diet well
pt aox4, lt IV site swollen, ivf infiltration, good cap refill, IV D/C, warm compress applied ,  re started rt ac # 20, infusing well
pt reavaluated and admitted to floor vital signs stable pt medicated as ordered and ivf infusing well  at present awaiting bed assignment
pt reavaluated and medicated as ordered vital signs stable
pt stable medicated with motrin 600mg po with some relief and voices no c/o at presentpt admitted awaiting bed assignment covid negative

## 2021-08-18 NOTE — PROGRESS NOTE ADULT - ASSESSMENT
A/P: 40 yo female admitted with fever of unknown origin   1. Admitted with fever.   -Medicine consult  -Regular diet  -Ambulate   -Vitals per routine   -IV fluids while febrile   -Follow up blood cx  2. Presumptive PID. Patient had intrauterine instrumentation during an attempt to remove her IUD. However, no significant tenderness on exam.   -Cefotetan 2 g IV q 12 hrs + Doxycycline 100 mg IV q 12 hrs  -Tylenol prn fever   -Monitor WBC and clinical response   3. Necrotic myomas seen on CT -- Possibly degenerating myomas. Possible source of fever.  -Pelvic MRI w/wo contrast in AM.   -NSAID prn pain   4. Menorrhagia -- Likely due to enlarging myomas +/- malpositioned IUD. Less likely endometrial neoplasia as Mirena IUD has been in place. However, will need to be ruled out.   -Lysteda 1300 mg 3x/day until bleeding stops or max of 5 days, whichever one comes first.   -Monitor Hb and vaginal bleeding   -D&C pending   5. Anemia, likely due to AUB  -Iron tid  -Colace 100 mg bid  6. IUD in situ, r/o malposition vs expelled  -Need to confirm position with MRI.   -Hysteroscopic IUD removal pending   A/P: 42 yo female admitted with fever of unknown origin  1. differential dx includes PID (less likely given benign exam) vs sepsis from necrotic myomas vs other unknown source   - s/p Medicine consult- recs appreciated   - ID consult placed per medicine recs   - continue Regular diet  -encourage ambulation   -Vitals per routine   -IV fluids while febrile   -Follow up blood cx  -continue Cefotetan 2 g IV q 12 hrs + Doxycycline 100 mg IV q 12 hrs  -Tylenol prn fever   -Monitor WBC and clinical response   - monitor H/H (hg down to 7.4, likely dilutional component, pt asymptomatic, will trend)   -f/u Pelvic MRI  -continue NSAID prn pain   2. Menorrhagia -- Likely due to enlarging myomas +/- malpositioned IUD. Less likely endometrial neoplasia but on ddx  -continue Lysteda 1300 mg 3x/day until bleeding stops or max of 5 days  -Monitor Hb and vaginal bleeding   -D&C pending   3. Anemia, likely due to AUB  -Iron tid  -Colace 100 mg bid  4. IUD in situ, r/o malposition vs expelled  - f/u MRI results    -Hysteroscopic IUD removal pending

## 2021-08-18 NOTE — ED ADULT NURSE REASSESSMENT NOTE - CONDITION
pt aox4, feeling better, seen by wound care Resident, admitted to Med Dr Mark/improved error/improved

## 2021-08-18 NOTE — PATIENT PROFILE ADULT - TOBACCO USE
Pt called and cancelled his appointment with Dr. Glory Arevalo. He stated he does not wish to reschedule.
Never smoker

## 2021-08-19 ENCOUNTER — TRANSCRIPTION ENCOUNTER (OUTPATIENT)
Age: 41
End: 2021-08-19

## 2021-08-19 VITALS
RESPIRATION RATE: 18 BRPM | TEMPERATURE: 98 F | OXYGEN SATURATION: 98 % | SYSTOLIC BLOOD PRESSURE: 128 MMHG | DIASTOLIC BLOOD PRESSURE: 81 MMHG | HEART RATE: 109 BPM

## 2021-08-19 DIAGNOSIS — N73.9 FEMALE PELVIC INFLAMMATORY DISEASE, UNSPECIFIED: ICD-10-CM

## 2021-08-19 LAB
BASOPHILS # BLD AUTO: 0.06 K/UL — SIGNIFICANT CHANGE UP (ref 0–0.2)
BASOPHILS NFR BLD AUTO: 0.5 % — SIGNIFICANT CHANGE UP (ref 0–2)
CULTURE RESULTS: SIGNIFICANT CHANGE UP
EOSINOPHIL # BLD AUTO: 0.31 K/UL — SIGNIFICANT CHANGE UP (ref 0–0.5)
EOSINOPHIL NFR BLD AUTO: 2.5 % — SIGNIFICANT CHANGE UP (ref 0–6)
HCT VFR BLD CALC: 21.1 % — LOW (ref 34.5–45)
HCT VFR BLD CALC: 22.1 % — LOW (ref 34.5–45)
HGB BLD-MCNC: 6.8 G/DL — CRITICAL LOW (ref 11.5–15.5)
HGB BLD-MCNC: 7.1 G/DL — LOW (ref 11.5–15.5)
IMM GRANULOCYTES NFR BLD AUTO: 0.6 % — SIGNIFICANT CHANGE UP (ref 0–1.5)
LYMPHOCYTES # BLD AUTO: 1.86 K/UL — SIGNIFICANT CHANGE UP (ref 1–3.3)
LYMPHOCYTES # BLD AUTO: 15 % — SIGNIFICANT CHANGE UP (ref 13–44)
MCHC RBC-ENTMCNC: 26.9 PG — LOW (ref 27–34)
MCHC RBC-ENTMCNC: 32.2 GM/DL — SIGNIFICANT CHANGE UP (ref 32–36)
MCV RBC AUTO: 83.4 FL — SIGNIFICANT CHANGE UP (ref 80–100)
MONOCYTES # BLD AUTO: 1.09 K/UL — HIGH (ref 0–0.9)
MONOCYTES NFR BLD AUTO: 8.8 % — SIGNIFICANT CHANGE UP (ref 2–14)
NEUTROPHILS # BLD AUTO: 8.97 K/UL — HIGH (ref 1.8–7.4)
NEUTROPHILS NFR BLD AUTO: 72.6 % — SIGNIFICANT CHANGE UP (ref 43–77)
NRBC # BLD: 0 /100 WBCS — SIGNIFICANT CHANGE UP (ref 0–0)
PLATELET # BLD AUTO: 399 K/UL — SIGNIFICANT CHANGE UP (ref 150–400)
PROCALCITONIN SERPL-MCNC: <0.05 — SIGNIFICANT CHANGE UP (ref 0–0.04)
RBC # BLD: 2.53 M/UL — LOW (ref 3.8–5.2)
RBC # FLD: 14.1 % — SIGNIFICANT CHANGE UP (ref 10.3–14.5)
SPECIMEN SOURCE: SIGNIFICANT CHANGE UP
WBC # BLD: 12.36 K/UL — HIGH (ref 3.8–10.5)
WBC # FLD AUTO: 12.36 K/UL — HIGH (ref 3.8–10.5)

## 2021-08-19 PROCEDURE — 85018 HEMOGLOBIN: CPT

## 2021-08-19 PROCEDURE — 81001 URINALYSIS AUTO W/SCOPE: CPT

## 2021-08-19 PROCEDURE — 87040 BLOOD CULTURE FOR BACTERIA: CPT

## 2021-08-19 PROCEDURE — 82607 VITAMIN B-12: CPT

## 2021-08-19 PROCEDURE — 84145 PROCALCITONIN (PCT): CPT

## 2021-08-19 PROCEDURE — 74177 CT ABD & PELVIS W/CONTRAST: CPT | Mod: MA

## 2021-08-19 PROCEDURE — 82728 ASSAY OF FERRITIN: CPT

## 2021-08-19 PROCEDURE — 85014 HEMATOCRIT: CPT

## 2021-08-19 PROCEDURE — 99285 EMERGENCY DEPT VISIT HI MDM: CPT | Mod: 25

## 2021-08-19 PROCEDURE — 76856 US EXAM PELVIC COMPLETE: CPT

## 2021-08-19 PROCEDURE — 85730 THROMBOPLASTIN TIME PARTIAL: CPT

## 2021-08-19 PROCEDURE — 83550 IRON BINDING TEST: CPT

## 2021-08-19 PROCEDURE — 86901 BLOOD TYPING SEROLOGIC RH(D): CPT

## 2021-08-19 PROCEDURE — 87491 CHLMYD TRACH DNA AMP PROBE: CPT

## 2021-08-19 PROCEDURE — 85610 PROTHROMBIN TIME: CPT

## 2021-08-19 PROCEDURE — 96365 THER/PROPH/DIAG IV INF INIT: CPT

## 2021-08-19 PROCEDURE — 36415 COLL VENOUS BLD VENIPUNCTURE: CPT

## 2021-08-19 PROCEDURE — 96366 THER/PROPH/DIAG IV INF ADDON: CPT

## 2021-08-19 PROCEDURE — 72197 MRI PELVIS W/O & W/DYE: CPT

## 2021-08-19 PROCEDURE — 86900 BLOOD TYPING SEROLOGIC ABO: CPT

## 2021-08-19 PROCEDURE — 84702 CHORIONIC GONADOTROPIN TEST: CPT

## 2021-08-19 PROCEDURE — 85027 COMPLETE CBC AUTOMATED: CPT

## 2021-08-19 PROCEDURE — 82746 ASSAY OF FOLIC ACID SERUM: CPT

## 2021-08-19 PROCEDURE — 87591 N.GONORRHOEAE DNA AMP PROB: CPT

## 2021-08-19 PROCEDURE — 99232 SBSQ HOSP IP/OBS MODERATE 35: CPT

## 2021-08-19 PROCEDURE — 83605 ASSAY OF LACTIC ACID: CPT

## 2021-08-19 PROCEDURE — 86769 SARS-COV-2 COVID-19 ANTIBODY: CPT

## 2021-08-19 PROCEDURE — 0225U NFCT DS DNA&RNA 21 SARSCOV2: CPT

## 2021-08-19 PROCEDURE — 83540 ASSAY OF IRON: CPT

## 2021-08-19 PROCEDURE — A9579: CPT

## 2021-08-19 PROCEDURE — 87086 URINE CULTURE/COLONY COUNT: CPT

## 2021-08-19 PROCEDURE — 80053 COMPREHEN METABOLIC PANEL: CPT

## 2021-08-19 PROCEDURE — 96375 TX/PRO/DX INJ NEW DRUG ADDON: CPT

## 2021-08-19 PROCEDURE — 86850 RBC ANTIBODY SCREEN: CPT

## 2021-08-19 PROCEDURE — 85025 COMPLETE CBC W/AUTO DIFF WBC: CPT

## 2021-08-19 RX ORDER — POLYETHYLENE GLYCOL 3350 17 G/17G
17 POWDER, FOR SOLUTION ORAL ONCE
Refills: 0 | Status: COMPLETED | OUTPATIENT
Start: 2021-08-19 | End: 2021-08-19

## 2021-08-19 RX ORDER — CEFPODOXIME PROXETIL 100 MG
200 TABLET ORAL EVERY 12 HOURS
Refills: 0 | Status: DISCONTINUED | OUTPATIENT
Start: 2021-08-19 | End: 2021-08-19

## 2021-08-19 RX ORDER — TRANEXAMIC ACID 100 MG/ML
1300 INJECTION, SOLUTION INTRAVENOUS ONCE
Refills: 0 | Status: DISCONTINUED | OUTPATIENT
Start: 2021-08-19 | End: 2021-08-19

## 2021-08-19 RX ORDER — ACETAMINOPHEN 500 MG
2 TABLET ORAL
Qty: 0 | Refills: 0 | DISCHARGE
Start: 2021-08-19

## 2021-08-19 RX ORDER — MEDROXYPROGESTERONE ACETATE 150 MG/ML
1 INJECTION, SUSPENSION, EXTENDED RELEASE INTRAMUSCULAR
Qty: 0 | Refills: 0 | DISCHARGE
Start: 2021-08-19

## 2021-08-19 RX ORDER — DOCUSATE SODIUM 100 MG
1 CAPSULE ORAL
Qty: 0 | Refills: 0 | DISCHARGE
Start: 2021-08-19

## 2021-08-19 RX ORDER — IBUPROFEN 200 MG
1 TABLET ORAL
Qty: 0 | Refills: 0 | DISCHARGE
Start: 2021-08-19

## 2021-08-19 RX ORDER — MEDROXYPROGESTERONE ACETATE 150 MG/ML
10 INJECTION, SUSPENSION, EXTENDED RELEASE INTRAMUSCULAR
Refills: 0 | Status: DISCONTINUED | OUTPATIENT
Start: 2021-08-20 | End: 2021-08-19

## 2021-08-19 RX ORDER — CEFPODOXIME PROXETIL 100 MG
1 TABLET ORAL
Qty: 16 | Refills: 0
Start: 2021-08-19

## 2021-08-19 RX ORDER — FERROUS SULFATE 325(65) MG
1 TABLET ORAL
Qty: 0 | Refills: 0 | DISCHARGE
Start: 2021-08-19

## 2021-08-19 RX ORDER — MEDROXYPROGESTERONE ACETATE 150 MG/ML
1 INJECTION, SUSPENSION, EXTENDED RELEASE INTRAMUSCULAR
Qty: 60 | Refills: 0
Start: 2021-08-19

## 2021-08-19 RX ADMIN — Medication 100 MILLIGRAM(S): at 17:14

## 2021-08-19 RX ADMIN — TRANEXAMIC ACID 1300 MILLIGRAM(S): 100 INJECTION, SOLUTION INTRAVENOUS at 13:36

## 2021-08-19 RX ADMIN — Medication 325 MILLIGRAM(S): at 13:36

## 2021-08-19 RX ADMIN — SODIUM CHLORIDE 75 MILLILITER(S): 9 INJECTION, SOLUTION INTRAVENOUS at 11:35

## 2021-08-19 RX ADMIN — Medication 600 MILLIGRAM(S): at 14:34

## 2021-08-19 RX ADMIN — Medication 200 MILLIGRAM(S): at 17:14

## 2021-08-19 RX ADMIN — Medication 325 MILLIGRAM(S): at 06:23

## 2021-08-19 RX ADMIN — Medication 110 MILLIGRAM(S): at 06:23

## 2021-08-19 RX ADMIN — TRANEXAMIC ACID 1300 MILLIGRAM(S): 100 INJECTION, SOLUTION INTRAVENOUS at 06:23

## 2021-08-19 RX ADMIN — Medication 600 MILLIGRAM(S): at 13:34

## 2021-08-19 RX ADMIN — Medication 5 MILLIGRAM(S): at 11:35

## 2021-08-19 RX ADMIN — Medication 100 GRAM(S): at 06:23

## 2021-08-19 RX ADMIN — Medication 100 MILLIGRAM(S): at 06:23

## 2021-08-19 RX ADMIN — POLYETHYLENE GLYCOL 3350 17 GRAM(S): 17 POWDER, FOR SOLUTION ORAL at 13:36

## 2021-08-19 NOTE — PROGRESS NOTE ADULT - ASSESSMENT
40 y/o F with PMHx of Non-Hodgkins lymphoma, Myomas, abnormal PAP smears presented to the ED from [facility] for fever admitted for sepsis secondary to possible pelvic infection/endometritis given recent instrumentation vs necrotic myomas seen on CT imaging.    #Fever  #Pelvic infection/endometritis secondary to manipulation of intrauterine contraception causing degeneration of fibroids   #Necrotic Uterine Myoma with possible superimposed infection on left  - sepsis POA (fever, leukocytosis, tachycardia) likely d/t IUD vs necrotic myomas  - s/p IV cefotetan + doxycycline for presumed PID, as per ID recs, changed over to po vantin and doxycycline; WBC downtrending, blood and urine cultures negative  - per GYN, had unimpressive pelvic exam  - for MRI Pelvis did not show presence of IUD  - GYN follow up Dr. Mcintyre to shanice for surgical intervention  - ID consult Dr. Herman for antibiotic management    #Anemia  #Heavy menses  - started on Fe supplementation  - iron studies show low iron, would consider IV venofer  - Hb down to approximately 7, will monitor closely, if downtrended may require PRBC transfusion    #Need for prophylactic measure  - VTE ppx: SCDs (has vaginal bleeding and will be getting TXA)

## 2021-08-19 NOTE — PROGRESS NOTE ADULT - ASSESSMENT
40 yo female presents with fevers, Necrotic myoma.  Pt started on tranexamic acid and iron with improvements in bleeding.  H/H dropped, and we will continue to watch. Pt currently asymptomatic.   Currently on antibiotics, and a regular diet.

## 2021-08-19 NOTE — DISCHARGE NOTE NURSING/CASE MANAGEMENT/SOCIAL WORK - PATIENT PORTAL LINK FT
You can access the FollowMyHealth Patient Portal offered by [facility] by registering at the following website: [email or web address]. By joining [facility]’s FollowMyHealth portal, you will also be able to view your health information using other applications (apps) compatible with our system.

## 2021-08-19 NOTE — DISCHARGE NOTE PROVIDER - NSDCQMAMI_CARD_ALL_CORE
Nursing staff entered patients room and patient not arousable, nursing staff started to yell patients name and sternal rub her, patient still not waking up. RRT called. No

## 2021-08-19 NOTE — PROGRESS NOTE ADULT - ASSESSMENT
A/P: 41y  admitted with fever presumptively due to PID after intrauterine instrumentation from unsuccessful IUD removal.  1. Fever -- Tmax = 101F at PSTs on . Tlast 100.4F on  at 11:20 AM. Patient has been afebrile for 24 hrs and leukocytosis has been trending down appropriately.  Blood and urine cx negative  Covid and other viral illnesses neg    -s/p ID consult -- appreciate recommendations: Rx Vantin 200 mg PO bid and Doxycycline 100 mg bid until .    2. Degenerating myomas -- Possible source of fever. Due to enlarging myomas.   -Patient is aware and agrees that definite management of myomas should be pursued. Will follow up at office and plan for surgery once antibiotics completed.   3. Presumptive PID -- no significant pelvic tenderness on admission exam, less likely cause of fever.   -Antibiotics as above   4. Mirena IUD, likely expelled with heavy bleeding. Not seen on CT scan, US, or MRI.   5. Anemia -- Due to AUB, which has now almost completely resolved.  -Patient will pursue definite management soon.  -Continue iron tid and colace   6. AUB/menorrhagia -- Likely due to myomas. However, endometrial neoplasia needs to be ruled out. An endometrial biopsy will be performed as outpatient once antibiotics are completed. Last dose of Lysteda tonight. Will start tomorrow Provera 10 mg bid until bleeding stops then daily.  A/P: 41y  admitted with fever presumptively due to PID after intrauterine instrumentation from unsuccessful IUD removal.  1. Fever -- Tmax = 101F at PSTs on . Tlast 100.4F on  at 11:20 AM. Patient has been afebrile for 24 hrs and leukocytosis has been trending down appropriately.  Blood and urine cx negative  Covid and other viral illnesses neg    -s/p ID consult -- appreciate recommendations: Rx Vantin 200 mg PO bid and Doxycycline 100 mg bid until .   -Patient will be discharged today.    2. Degenerating myomas -- Possible source of fever. Due to enlarging myomas.   -Patient is aware and agrees that definite management of myomas should be pursued. Will follow up at office and plan for surgery once antibiotics completed.   3. Presumptive PID -- no significant pelvic tenderness on admission exam, less likely cause of fever.   -Antibiotics as above   4. Mirena IUD, likely expelled with heavy bleeding. Not seen on CT scan, US, or MRI.   -Planned procedure for tomorrow is cancelled.   5. Anemia -- Due to AUB, which has now almost completely resolved.  -Patient will pursue definite management soon.  -Continue iron tid and colace   6. AUB/menorrhagia -- Likely due to myomas. However, endometrial neoplasia needs to be ruled out. An endometrial biopsy will be performed as outpatient once antibiotics are completed. Last dose of Lysteda tonight. Will start tomorrow Provera 10 mg bid until bleeding stops then daily.

## 2021-08-19 NOTE — DISCHARGE NOTE PROVIDER - PROVIDER TOKENS
PROVIDER:[TOKEN:[[id number]],FOLLOWUP:[1 week],ESTABLISHEDPATIENT:[T]],PROVIDER:[TOKEN:[[id number]],FOLLOWUP:[1 week],ESTABLISHEDPATIENT:[T]]

## 2021-08-19 NOTE — PROGRESS NOTE ADULT - ASSESSMENT
41y [id number] sent from PSTs due to fever. Patient is scheduled for Hysteroscopy, D&C, IUD removal and was at the hospital doing her PST. She had an unsuccessful attempt at removing a Mirena IUD at the office on 8/5/2021. The IUD was not clearly seen on imaging, including an MRI. However, Dr. Faye saw it on US that day.   Of note, the patient has been experiencing heavy menstrual bleeding for the past few months. Pelvic US at the office showed her myomas have increased in size. An endometrial biopsy was recommended but has not been performed as of yet. A sample was to be taken during her upcoming procedure.   A Mirena IUD has been in place since 5/2018.    RECOMMENDATIONS    resolved fever, improving leukocytosis,     pt clinically very stable so certainly reasonable to dc on oral abx with close outpt follow up  -find to dc on   Vantin 200mg PO BID and   Doxycycline 100mg PO BID with last day 8/27

## 2021-08-19 NOTE — PROGRESS NOTE ADULT - SUBJECTIVE AND OBJECTIVE BOX
INTERVAL HPI/OVERNIGHT EVENTS: Pt seen and examined at bedside.  pt states she is feeling much better since being admitted last night. afebrile since admission.     MEDICATIONS  (STANDING):  cefoTEtan  IVPB 2 Gram(s) IV Intermittent every 12 hours  docusate sodium 100 milliGRAM(s) Oral two times a day  doxycycline IVPB 100 milliGRAM(s) IV Intermittent every 12 hours  ferrous    sulfate 325 milliGRAM(s) Oral three times a day  lactated ringers. 1000 milliLiter(s) (75 mL/Hr) IV Continuous <Continuous>  tranexamic  acid 1300 milliGRAM(s) Oral three times a day    MEDICATIONS  (PRN):  acetaminophen   Tablet .. 650 milliGRAM(s) Oral every 6 hours PRN Temp greater or equal to 38C (100.4F)  ibuprofen  Tablet. 600 milliGRAM(s) Oral every 6 hours PRN Mild Pain (1 - 3), Moderate Pain (4 - 6), Severe Pain (7 - 10)      Vital Signs Last 24 Hrs  T(C): 37 (18 Aug 2021 07:14), Max: 38.6 (17 Aug 2021 13:22)  T(F): 98.6 (18 Aug 2021 07:14), Max: 101.4 (17 Aug 2021 13:22)  HR: 98 (18 Aug 2021 09:30) (74 - 112)  BP: 108/66 (18 Aug 2021 09:30) (103/69 - 137/85)  BP(mean): 82 (17 Aug 2021 13:22) (82 - 82)  RR: 16 (18 Aug 2021 09:30) (16 - 18)  SpO2: 97% (18 Aug 2021 09:30) (97% - 100%)    PHYSICAL EXAM:    GA: NAD, A+0 x 3  Abd:     LABS:                        7.4    16.98 )-----------( 377      ( 18 Aug 2021 06:23 )             23.0     08-18    142  |  110<H>  |  4<L>  ----------------------------<  106<H>  3.6   |  26  |  0.41<L>    Ca    7.9<L>      18 Aug 2021 06:23    TPro  6.4  /  Alb  2.2<L>  /  TBili  0.6  /  DBili  x   /  AST  35  /  ALT  46  /  AlkPhos  89  08-18    PT/INR - ( 17 Aug 2021 16:22 )   PT: 15.4 sec;   INR: 1.33 ratio         PTT - ( 17 Aug 2021 16:22 )  PTT:26.8 sec  Urinalysis Basic - ( 17 Aug 2021 16:44 )    Color: Yellow / Appearance: Clear / S.005 / pH: x  Gluc: x / Ketone: Moderate  / Bili: Negative / Urobili: 1   Blood: x / Protein: 30 mg/dL / Nitrite: Negative   Leuk Esterase: Small / RBC: 25-50 /HPF / WBC 3-5   Sq Epi: x / Non Sq Epi: Occasional / Bacteria: Few      ABO Interpretation --  Rh Interpretation --  Antibody Screen NEG  ABO Interpretation --  Rh Interpretation --  Antibody Screen NEG      RADIOLOGY & ADDITIONAL TESTS:  
[facility] DIVISION of INFECTIOUS DISEASE  Jorge Herman MD PhD, Bridget Renee MD, Vernell Doe MD, Jackelyn Peace MD  and providing coverage with Molly Tamayo MD and Shun Eaton MD  Providing Infectious Disease Consultations at [facility], [facility], [facility]'s    Office# [phone] to schedule follow up appointments  Answering Service for urgent calls or New Consults [phone]  Cell# to text for urgent issues Jorge Herman [phone]     infectious diseases progress note:    ALEKSANDR BAEZ is a 41y y. o. Female patient    No concerning overnight events, pt reports she feels better and would like to leave hospital on oral abx if possible    Allergies    No Known Allergies    Intolerances        ANTIBIOTICS/RELEVANT:  antimicrobials  cefoTEtan  IVPB 2 Gram(s) IV Intermittent every 12 hours  doxycycline IVPB 100 milliGRAM(s) IV Intermittent every 12 hours    immunologic:    OTHER:  acetaminophen   Tablet .. 650 milliGRAM(s) Oral every 6 hours PRN  bisacodyl 5 milliGRAM(s) Oral once  docusate sodium 100 milliGRAM(s) Oral two times a day  ferrous    sulfate 325 milliGRAM(s) Oral three times a day  ibuprofen  Tablet. 600 milliGRAM(s) Oral every 6 hours PRN  lactated ringers. 1000 milliLiter(s) IV Continuous <Continuous>  polyethylene glycol 3350 17 Gram(s) Oral once  tranexamic  acid 1300 milliGRAM(s) Oral three times a day      Objective:  Vital Signs Last 24 Hrs  T(C): 36.9 (19 Aug 2021 08:12), Max: 37.8 (18 Aug 2021 22:17)  T(F): 98.4 (19 Aug 2021 08:12), Max: 100.1 (18 Aug 2021 22:17)  HR: 97 (19 Aug 2021 08:12) (96 - 118)  BP: 102/68 (19 Aug 2021 08:12) (102/68 - 120/80)  BP(mean): --  RR: 18 (19 Aug 2021 08:12) (18 - 18)  SpO2: 97% (19 Aug 2021 08:12) (95% - 100%)    T(C): 36.9 (21 @ 08:12), Max: 38.6 (21 @ 13:22)  T(C): 36.9 (21 @ 08:12), Max: 38.6 (21 @ 13:22)  T(C): 36.9 (21 @ 08:12), Max: 38.6 (21 @ 13:22)    PHYSICAL EXAM:  HEENT: NC atraumatic  Neck: supple  Respiratory: no accessory muscle use, breathing comfortably  Cardiovascular: distant  Gastrointestinal: normal appearing, nondistended  Extremities: no clubbing, no cyanosis,        LABS:                          7.1    x     )-----------( x        ( 19 Aug 2021 11:13 )             22.1       12.36  @ 07:20  16.98  @ 06:23  18.89  @ 16:22  19.59  @ 14:00      08    142  |  110<H>  |  4<L>  ----------------------------<  106<H>  3.6   |  26  |  0.41<L>    Ca    7.9<L>      18 Aug 2021 06:23    TPro  6.4  /  Alb  2.2<L>  /  TBili  0.6  /  DBili  x   /  AST  35  /  ALT  46  /  AlkPhos  89        Creatinine, Serum: 0.41 mg/dL (21 @ 06:23)  Creatinine, Serum: 0.49 mg/dL (21 @ 16:22)  Creatinine, Serum: 0.50 mg/dL (21 @ 14:00)      PT/INR - ( 17 Aug 2021 16:22 )   PT: 15.4 sec;   INR: 1.33 ratio         PTT - ( 17 Aug 2021 16:22 )  PTT:26.8 sec  Urinalysis Basic - ( 17 Aug 2021 16:44 )    Color: Yellow / Appearance: Clear / S.005 / pH: x  Gluc: x / Ketone: Moderate  / Bili: Negative / Urobili: 1   Blood: x / Protein: 30 mg/dL / Nitrite: Negative   Leuk Esterase: Small / RBC: 25-50 /HPF / WBC 3-5   Sq Epi: x / Non Sq Epi: Occasional / Bacteria: Few            INFLAMMATORY MARKERS  Auto Neutrophil #: 8.97 K/uL (21 @ 07:20)  Auto Lymphocyte #: 1.86 K/uL (21 @ 07:20)  Auto Lymphocyte #: 1.37 K/uL (21 @ 16:22)  Auto Neutrophil #: 15.59 K/uL (21 @ 16:22)    Lactate, Blood: 1.1 mmol/L (21 @ 16:22)    Auto Eosinophil #: 0.31 K/uL (21 @ 07:20)  Auto Eosinophil #: 0.02 K/uL (21 @ 16:22)        Procalcitonin, Serum: <0.05 ng/mL (21 @ 06:23)            Ferritin, Serum: 83 ng/mL (21 @ 10:56)        INR: 1.33 ratio (21 @ 16:22)  Activated Partial Thromboplastin Time: 26.8 sec (21 @ 16:22)          MICROBIOLOGY:              RADIOLOGY & ADDITIONAL STUDIES:  
HD #3    S: Patient feels better. Bleeding is now light. No dizziness. Has a headache, which she attributes to Lysteda. No pelvic pain. Ambulating.     O:   T(F): 98.9 (19 Aug 2021 11:51), Max: 100.1 (18 Aug 2021 22:17)  HR: 104 (19 Aug 2021 11:51) (96 - 118)  BP: 98/67 (19 Aug 2021 11:51) (98/67 - 120/80)  RR: 18 (19 Aug 2021 11:51) (18 - 18)  SpO2: 96% (19 Aug 2021 11:51) (95% - 100%)  Gen: AAO x 3    Labs:                          7.1    x     )-----------( x        ( 19 Aug 2021 11:13 )             22.1     08-18    142  |  110<H>  |  4<L>  ----------------------------<  106<H>  3.6   |  26  |  0.41<L>    Ca    7.9<L>      18 Aug 2021 06:23    TPro  6.4  /  Alb  2.2<L>  /  TBili  0.6  /  DBili  x   /  AST  35  /  ALT  46  /  AlkPhos  89  08-18    I&O's Summary    18 Aug 2021 07:01  -  19 Aug 2021 07:00  --------------------------------------------------------  IN: 950 mL / OUT: 500 mL / NET: 450 mL        MEDICATIONS  (STANDING):  cefoTEtan  IVPB 2 Gram(s) IV Intermittent every 12 hours  docusate sodium 100 milliGRAM(s) Oral two times a day  doxycycline IVPB 100 milliGRAM(s) IV Intermittent every 12 hours  ferrous    sulfate 325 milliGRAM(s) Oral three times a day  lactated ringers. 1000 milliLiter(s) (75 mL/Hr) IV Continuous <Continuous>  tranexamic  acid 1300 milliGRAM(s) Oral three times a day    MEDICATIONS  (PRN):  acetaminophen   Tablet .. 650 milliGRAM(s) Oral every 6 hours PRN Temp greater or equal to 38C (100.4F)  ibuprofen  Tablet. 600 milliGRAM(s) Oral every 6 hours PRN Mild Pain (1 - 3), Moderate Pain (4 - 6), Severe Pain (7 - 10)          
SUBJECTIVE:    No acute events overnight, afebrile, hds.  Pt denies any cp, sob, lightheadedness.  She has felt weakness that she understands maybe related ot underlying infection.    VITAL SIGNS:    Vital Signs Last 24 Hrs  T(C): 37.2 (19 Aug 2021 11:51), Max: 37.8 (18 Aug 2021 22:17)  T(F): 98.9 (19 Aug 2021 11:51), Max: 100.1 (18 Aug 2021 22:17)  HR: 104 (19 Aug 2021 11:51) (96 - 118)  BP: 98/67 (19 Aug 2021 11:51) (98/67 - 120/80)  BP(mean): --  RR: 18 (19 Aug 2021 11:51) (18 - 18)  SpO2: 96% (19 Aug 2021 11:51) (95% - 100%)    PHYSICAL EXAM:     GENERAL: NAD  HEENT: eomi, MMM  RESPIRATORY: CTAB  CARDIOVASCULAR: s1 and s2, rrr, no m/r/g  ABDOMINAL: soft, nd, nt, +bs   EXTREMITIES: no c/c/e  NEUROLOGICAL: alert and oriented x 3, non-focal  SKIN: no rashes or lesions   MUSCULOSKELETAL: no gross joint deformity                          7.1    x     )-----------( x        ( 19 Aug 2021 11:13 )             22.1     08-18    142  |  110<H>  |  4<L>  ----------------------------<  106<H>  3.6   |  26  |  0.41<L>    Ca    7.9<L>      18 Aug 2021 06:23    TPro  6.4  /  Alb  2.2<L>  /  TBili  0.6  /  DBili  x   /  AST  35  /  ALT  46  /  AlkPhos  89  08-18      CAPILLARY BLOOD GLUCOSE          MEDICATIONS  (STANDING):  cefpodoxime 200 milliGRAM(s) Oral every 12 hours  docusate sodium 100 milliGRAM(s) Oral two times a day  doxycycline hyclate Capsule 100 milliGRAM(s) Oral every 12 hours  ferrous    sulfate 325 milliGRAM(s) Oral three times a day  lactated ringers. 1000 milliLiter(s) (75 mL/Hr) IV Continuous <Continuous>  tranexamic  acid 1300 milliGRAM(s) Oral once      
SUBJECTIVE:    No acute events overnight, afebrle, hds.    VITAL SIGNS:    Vital Signs Last 24 Hrs  T(C): 37 (18 Aug 2021 07:14), Max: 38.6 (17 Aug 2021 13:22)  T(F): 98.6 (18 Aug 2021 07:14), Max: 101.4 (17 Aug 2021 13:22)  HR: 98 (18 Aug 2021 09:30) (74 - 112)  BP: 108/66 (18 Aug 2021 09:30) (103/69 - 137/85)  BP(mean): 82 (17 Aug 2021 13:22) (82 - 82)  RR: 16 (18 Aug 2021 09:30) (16 - 18)  SpO2: 97% (18 Aug 2021 09:30) (97% - 100%)    PHYSICAL EXAM:     GENERAL: no acute distress  HEENT:  EOMI, neck supple, MMM  RESPIRATORY: LCTAB/L, no rhonchi, rales, or wheezing  CARDIOVASCULAR: RRR, no murmurs, gallops, rubs  ABDOMINAL: soft, no TTP, non-distended, positive bowel sounds   EXTREMITIES: no clubbing, cyanosis, or edema  NEUROLOGICAL: alert and oriented x 3, non-focal  SKIN: no new rashes or lesions   MUSCULOSKELETAL: no gross joint deformity                          7.4    16.98 )-----------( 377      ( 18 Aug 2021 06:23 )             23.0     08-18    142  |  110<H>  |  4<L>  ----------------------------<  106<H>  3.6   |  26  |  0.41<L>    Ca    7.9<L>      18 Aug 2021 06:23    TPro  6.4  /  Alb  2.2<L>  /  TBili  0.6  /  DBili  x   /  AST  35  /  ALT  46  /  AlkPhos  89  08-18      CAPILLARY BLOOD GLUCOSE          MEDICATIONS  (STANDING):  cefoTEtan  IVPB 2 Gram(s) IV Intermittent every 12 hours  docusate sodium 100 milliGRAM(s) Oral two times a day  doxycycline IVPB 100 milliGRAM(s) IV Intermittent every 12 hours  ferrous    sulfate 325 milliGRAM(s) Oral three times a day  lactated ringers. 1000 milliLiter(s) (75 mL/Hr) IV Continuous <Continuous>  tranexamic  acid 1300 milliGRAM(s) Oral three times a day      
Hospital day: 1    41y Female admitted with Female pelvic inflammatory disease      Patient seen and examined bedside resting comfortably.  States she continues to feel better and stronger. States bleeding is finally slowing down, but has not stopped.  Denies lightheadedness, Shortness or breath, palpitations,  anxiety, Dizziness.  Is ambulating, voiding, passing flatus.  Overall improved.     T(F): 98.4 (08-19-21 @ 08:12), Max: 100.4 (08-18-21 @ 11:20)  HR: 97 (08-19-21 @ 08:12) (96 - 118)  BP: 102/68 (08-19-21 @ 08:12) (102/64 - 120/80)  RR: 18 (08-19-21 @ 08:12) (16 - 18)  SpO2: 97% (08-19-21 @ 08:12) (95% - 100%)  Wt(kg): --  CAPILLARY BLOOD GLUCOSE          PHYSICAL EXAM:  General: NAD  Neuro:  Alert & oriented x 3  CV: +S1+S2 regular rate and rhythm  Lung: clear to ausculation bilaterally  Abdomen: BS+ Soft, NT, ND  Extremities: no pedal edema or calf tenderness noted        LABS:                        6.8    12.36 )-----------( 399      ( 19 Aug 2021 07:20 )             21.1     08-18    142  |  110<H>  |  4<L>  ----------------------------<  106<H>  3.6   |  26  |  0.41<L>    Ca    7.9<L>      18 Aug 2021 06:23    TPro  6.4  /  Alb  2.2<L>  /  TBili  0.6  /  DBili  x   /  AST  35  /  ALT  46  /  AlkPhos  89  08-18    PT/INR - ( 17 Aug 2021 16:22 )   PT: 15.4 sec;   INR: 1.33 ratio         PTT - ( 17 Aug 2021 16:22 )  PTT:26.8 sec  I&O's Detail    18 Aug 2021 07:01  -  19 Aug 2021 07:00  --------------------------------------------------------  IN:    IV PiggyBack: 50 mL    IV PiggyBack: 100 mL    Lactated Ringers: 600 mL    Oral Fluid: 200 mL  Total IN: 950 mL    OUT:    Voided (mL): 500 mL  Total OUT: 500 mL    Total NET: 450 mL            RADIOLOGY:  < from: MR Pelvis w/wo IV Cont (08.18.21 @ 10:32) >  EXAM:  MR PELVIS WAW IC                            PROCEDURE DATE:  08/18/2021          INTERPRETATION:  CLINICAL INFORMATION: Menorrhagia    COMPARISON: CT and ultrasound dated 8/17/2021    CONTRAST/COMPLICATIONS:  IV Contrast: Gadavist  10 cc administered   0 cc discarded  Oral Contrast: NONE  Complications: None reported at time of study completion    PROCEDURE:  MRI of the pelvis was performed.  -    FINDINGS:  UTERUS: Enlarged, measuring 16.3 x 9.5 x 15.5 cm. Multiple fibroids, largest measuring 8.5 x 6.4 x 7.5 cm (right fundal, necrotic), 5.8 x 5.4 x 6.3 cm (left fundal/uterine body, partially necrotic), and 3.3 x 3 x 2.8 cm (lower uterine body).  ENDOMETRIUM: 6 mm; distorted by uterine fibroids, no intrauterine device identified  JUNCTIONAL ZONE: 5 mm    RIGHT OVARY: 2.8 x 1.7 x 2.7 cm  LEFT OVARY: 2.9 x 1.6 x 2 cm  ADNEXA: Within normal limits.    BLADDER: Within normal limits.    LYMPH NODES: Prominent left pelvic lymph node measuring 1.4 x 0.9 cm.    VISUALIZED PORTIONS:    ABDOMINAL ORGANS: Within normal limits.  BOWEL: Within normal limits.  PERITONEUM: Trace pelvic fluid.  VESSELS: Within normal limits.  ABDOMINAL WALL: Within normal limits.  BONES: Within normal limits.    IMPRESSION:  Enlarged uterus containing multiple fibroids, some of which are necrotic.  No intrauterine device identified.        --- End of Report ---            CASSIE MAYERS MD; Attending Radiologist  This document has been electronically signed. Aug 18 2021  3:20PM    < end of copied text >

## 2021-08-19 NOTE — DISCHARGE NOTE PROVIDER - NSDCCPCAREPLAN_GEN_ALL_CORE_FT
PRINCIPAL DISCHARGE DIAGNOSIS  Diagnosis: Fever  Assessment and Plan of Treatment: antibiotics      SECONDARY DISCHARGE DIAGNOSES  Diagnosis: Pelvic inflammatory disease (PID)  Assessment and Plan of Treatment: antibiotics    Diagnosis: Fibroid uterus  Assessment and Plan of Treatment: will need surgery    Diagnosis: Menorrhagia  Assessment and Plan of Treatment: provera    Diagnosis: Anemia due to acute blood loss  Assessment and Plan of Treatment: iron

## 2021-08-19 NOTE — DISCHARGE NOTE PROVIDER - HOSPITAL COURSE
Admitted with fever presumptively due to PID after intrauterine instrumentation during unsuccessful IUD removal. Tmax 101F at [facility] for planned Hysteroscopy, D&C, IUD removal on 8/20/2021. Patient started on Cefotetan 2 g q 12 hrs and Doxycycline 100 mg bid q 12 hrs. Last fever on 8/18/2021 at 11:20 am was 100.4. Remained afebrile for >24 hrs and leukocytosis is trending down appropriately. Blood and urine cx were negative. Covid and other viral illnesses were also negative. s/p ID consult. Patient discharged on Vantin 200 mg PO bid and Doxycycline 100 mg PO bid with last dose on 8/27/2021.  Patient also with menorrhagia causing iron deficiency anemia. Hb stable at 7. Started on Lysteda, which significantly slowed down her bleeding. Patient completed 48 hrs of this treatment. Will stop tonight. Tomorrow, she will start on Provera 10 mg bid until bleeding stops completely, then continue one daily. She will continue iron 3x/day with colace 100 mg bid. The patient will need an endometrial sample as outpatient once all antibiotics are completed/   Mirena IUD appears to have been expelled. Not seen on CT scan, US, or MRI.   Degenerating myomas noted on US. Patient is aware and agrees that definite management should be pursued. Will coordinate as outpatient once antibiotics completed and endometrial biopsy is done.   Bleeding and fever precautions given to patient. She will follow up at the office next week.   Planned procedure for tomorrow is cancelled.

## 2021-08-19 NOTE — DISCHARGE NOTE PROVIDER - CARE PROVIDER_API CALL
Danita Vines  OBSTETRICS AND GYNECOLOGY  [address], [address]  [place] [address]  Phone: [phone]  Fax: [phone]  Established Patient  Follow Up Time: 1 week    Garo Faye)  Obstetrics and Gynecology  [address] [place] [address]  Phone: [phone]  Fax: [phone]  Established Patient  Follow Up Time: 1 week

## 2021-08-19 NOTE — DISCHARGE NOTE PROVIDER - NSDCMRMEDTOKEN_GEN_ALL_CORE_FT
acetaminophen 325 mg oral tablet: 2 tab(s) orally every 6 hours, As needed, Temp greater or equal to 38C (100.4F)  cefpodoxime 200 mg oral tablet: 1 tab(s) orally every 12 hours  docusate sodium 100 mg oral capsule: 1 cap(s) orally 2 times a day  doxycycline monohydrate 100 mg oral capsule: 1 cap(s) orally every 12 hours  ferrous sulfate 325 mg (65 mg elemental iron) oral tablet: 1 tab(s) orally 3 times a day  ibuprofen 600 mg oral tablet: 1 tab(s) orally every 6 hours, As needed, Mild Pain (1 - 3), Moderate Pain (4 - 6), Severe Pain (7 - 10)  medroxyPROGESTERone 10 mg oral tablet: 1 tab(s) orally 2 times a day

## 2021-08-19 NOTE — PROGRESS NOTE ADULT - PROBLEM SELECTOR PLAN 1
Continue antibiotics- will get ID recs for PO antibiotics for D/C home.  Continue regular diet,   Continue OOB  Will redraw h/h- for reevaluation  Continue to monitor.  Continue tranexamic acid   Discussed with Dr Mark

## 2021-08-20 LAB
C TRACH RRNA SPEC QL NAA+PROBE: SIGNIFICANT CHANGE UP
N GONORRHOEA RRNA SPEC QL NAA+PROBE: SIGNIFICANT CHANGE UP

## 2021-08-22 LAB
CULTURE RESULTS: SIGNIFICANT CHANGE UP
CULTURE RESULTS: SIGNIFICANT CHANGE UP
SPECIMEN SOURCE: SIGNIFICANT CHANGE UP
SPECIMEN SOURCE: SIGNIFICANT CHANGE UP

## 2021-10-28 PROBLEM — O26.30: Chronic | Status: ACTIVE | Noted: 2021-08-17

## 2021-10-28 PROBLEM — Z87.898 PERSONAL HISTORY OF OTHER SPECIFIED CONDITIONS: Chronic | Status: ACTIVE | Noted: 2021-08-17

## 2021-11-04 ENCOUNTER — OUTPATIENT (OUTPATIENT)
Dept: OUTPATIENT SERVICES | Facility: HOSPITAL | Age: 41
LOS: 1 days | End: 2021-11-04
Payer: COMMERCIAL

## 2021-11-04 VITALS
HEART RATE: 87 BPM | DIASTOLIC BLOOD PRESSURE: 73 MMHG | OXYGEN SATURATION: 100 % | HEIGHT: 62 IN | WEIGHT: 231.04 LBS | TEMPERATURE: 98 F | SYSTOLIC BLOOD PRESSURE: 108 MMHG | RESPIRATION RATE: 13 BRPM

## 2021-11-04 DIAGNOSIS — Z01.818 ENCOUNTER FOR OTHER PREPROCEDURAL EXAMINATION: ICD-10-CM

## 2021-11-04 DIAGNOSIS — Z90.49 ACQUIRED ABSENCE OF OTHER SPECIFIED PARTS OF DIGESTIVE TRACT: Chronic | ICD-10-CM

## 2021-11-04 DIAGNOSIS — Z45.2 ENCOUNTER FOR ADJUSTMENT AND MANAGEMENT OF VASCULAR ACCESS DEVICE: Chronic | ICD-10-CM

## 2021-11-04 DIAGNOSIS — N93.9 ABNORMAL UTERINE AND VAGINAL BLEEDING, UNSPECIFIED: ICD-10-CM

## 2021-11-04 DIAGNOSIS — D25.9 LEIOMYOMA OF UTERUS, UNSPECIFIED: ICD-10-CM

## 2021-11-04 LAB
A1C WITH ESTIMATED AVERAGE GLUCOSE RESULT: 5.2 % — SIGNIFICANT CHANGE UP (ref 4–5.6)
BLD GP AB SCN SERPL QL: SIGNIFICANT CHANGE UP
ESTIMATED AVERAGE GLUCOSE: 103 MG/DL — SIGNIFICANT CHANGE UP (ref 68–114)
HCG SERPL-ACNC: <1 MIU/ML — SIGNIFICANT CHANGE UP
HCT VFR BLD CALC: 33.8 % — LOW (ref 34.5–45)
HGB BLD-MCNC: 11 G/DL — LOW (ref 11.5–15.5)
MCHC RBC-ENTMCNC: 28.4 PG — SIGNIFICANT CHANGE UP (ref 27–34)
MCHC RBC-ENTMCNC: 32.5 GM/DL — SIGNIFICANT CHANGE UP (ref 32–36)
MCV RBC AUTO: 87.1 FL — SIGNIFICANT CHANGE UP (ref 80–100)
NRBC # BLD: 0 /100 WBCS — SIGNIFICANT CHANGE UP (ref 0–0)
PLATELET # BLD AUTO: 329 K/UL — SIGNIFICANT CHANGE UP (ref 150–400)
RBC # BLD: 3.88 M/UL — SIGNIFICANT CHANGE UP (ref 3.8–5.2)
RBC # FLD: 16.9 % — HIGH (ref 10.3–14.5)
WBC # BLD: 10.17 K/UL — SIGNIFICANT CHANGE UP (ref 3.8–10.5)
WBC # FLD AUTO: 10.17 K/UL — SIGNIFICANT CHANGE UP (ref 3.8–10.5)

## 2021-11-04 PROCEDURE — 86901 BLOOD TYPING SEROLOGIC RH(D): CPT

## 2021-11-04 PROCEDURE — 86850 RBC ANTIBODY SCREEN: CPT

## 2021-11-04 PROCEDURE — 86900 BLOOD TYPING SEROLOGIC ABO: CPT

## 2021-11-04 PROCEDURE — 84702 CHORIONIC GONADOTROPIN TEST: CPT

## 2021-11-04 PROCEDURE — 85027 COMPLETE CBC AUTOMATED: CPT

## 2021-11-04 PROCEDURE — 36415 COLL VENOUS BLD VENIPUNCTURE: CPT

## 2021-11-04 PROCEDURE — G0463: CPT

## 2021-11-04 PROCEDURE — 83036 HEMOGLOBIN GLYCOSYLATED A1C: CPT

## 2021-11-04 NOTE — H&P PST ADULT - NSICDXPASTMEDICALHX_GEN_ALL_CORE_FT
PAST MEDICAL HISTORY:  Abnormal vaginal bleeding     Cholecystitis     H/O fever 8/17    Non Hodgkin's lymphoma     Retained intrauterine contraceptive device in pregnancy, unspecified trimester for heavy bleeding and uterine fibroids

## 2021-11-04 NOTE — H&P PST ADULT - HISTORY OF PRESENT ILLNESS
40 yo F for laparoscopic subtotal hysterectomy w bilat salpingectomy   c/o prolonged menses and abnormal vaginal bleeding   since 2021

## 2021-11-04 NOTE — H&P PST ADULT - ASSESSMENT
42 yo F w abnormal vaginal bleeding for subtotal hysterectomy  w bilat salpingectomy  11/17/21    medical clearance pending    advised continue iron

## 2021-11-06 PROBLEM — N93.9 ABNORMAL UTERINE AND VAGINAL BLEEDING, UNSPECIFIED: Chronic | Status: ACTIVE | Noted: 2021-11-04

## 2021-11-13 DIAGNOSIS — Z01.818 ENCOUNTER FOR OTHER PREPROCEDURAL EXAMINATION: ICD-10-CM

## 2021-11-14 ENCOUNTER — APPOINTMENT (OUTPATIENT)
Dept: DISASTER EMERGENCY | Facility: CLINIC | Age: 41
End: 2021-11-14

## 2021-11-15 LAB — SARS-COV-2 N GENE NPH QL NAA+PROBE: NOT DETECTED

## 2021-11-16 ENCOUNTER — TRANSCRIPTION ENCOUNTER (OUTPATIENT)
Age: 41
End: 2021-11-16

## 2021-11-17 ENCOUNTER — RESULT REVIEW (OUTPATIENT)
Age: 41
End: 2021-11-17

## 2021-11-17 ENCOUNTER — OUTPATIENT (OUTPATIENT)
Dept: OUTPATIENT SERVICES | Facility: HOSPITAL | Age: 41
LOS: 1 days | End: 2021-11-17
Payer: COMMERCIAL

## 2021-11-17 VITALS
TEMPERATURE: 98 F | HEART RATE: 100 BPM | RESPIRATION RATE: 12 BRPM | SYSTOLIC BLOOD PRESSURE: 139 MMHG | HEIGHT: 62 IN | WEIGHT: 231.04 LBS | OXYGEN SATURATION: 100 % | DIASTOLIC BLOOD PRESSURE: 84 MMHG

## 2021-11-17 VITALS
RESPIRATION RATE: 13 BRPM | SYSTOLIC BLOOD PRESSURE: 123 MMHG | TEMPERATURE: 98 F | HEART RATE: 89 BPM | DIASTOLIC BLOOD PRESSURE: 60 MMHG | OXYGEN SATURATION: 99 %

## 2021-11-17 DIAGNOSIS — Z45.2 ENCOUNTER FOR ADJUSTMENT AND MANAGEMENT OF VASCULAR ACCESS DEVICE: Chronic | ICD-10-CM

## 2021-11-17 DIAGNOSIS — Z90.49 ACQUIRED ABSENCE OF OTHER SPECIFIED PARTS OF DIGESTIVE TRACT: Chronic | ICD-10-CM

## 2021-11-17 DIAGNOSIS — D25.9 LEIOMYOMA OF UTERUS, UNSPECIFIED: ICD-10-CM

## 2021-11-17 LAB
HCG UR QL: NEGATIVE — SIGNIFICANT CHANGE UP
HCT VFR BLD CALC: 28.9 % — LOW (ref 34.5–45)
HGB BLD-MCNC: 9.5 G/DL — LOW (ref 11.5–15.5)
MCHC RBC-ENTMCNC: 28.5 PG — SIGNIFICANT CHANGE UP (ref 27–34)
MCHC RBC-ENTMCNC: 32.9 GM/DL — SIGNIFICANT CHANGE UP (ref 32–36)
MCV RBC AUTO: 86.8 FL — SIGNIFICANT CHANGE UP (ref 80–100)
NRBC # BLD: 0 /100 WBCS — SIGNIFICANT CHANGE UP (ref 0–0)
PLATELET # BLD AUTO: 513 K/UL — HIGH (ref 150–400)
RBC # BLD: 3.33 M/UL — LOW (ref 3.8–5.2)
RBC # FLD: 15.8 % — HIGH (ref 10.3–14.5)
WBC # BLD: 17.3 K/UL — HIGH (ref 3.8–10.5)
WBC # FLD AUTO: 17.3 K/UL — HIGH (ref 3.8–10.5)

## 2021-11-17 PROCEDURE — 81025 URINE PREGNANCY TEST: CPT

## 2021-11-17 PROCEDURE — 58544 LSH W/T/O UTERUS ABOVE 250 G: CPT

## 2021-11-17 PROCEDURE — C1889: CPT

## 2021-11-17 PROCEDURE — 88342 IMHCHEM/IMCYTCHM 1ST ANTB: CPT | Mod: 26,59

## 2021-11-17 PROCEDURE — 36415 COLL VENOUS BLD VENIPUNCTURE: CPT

## 2021-11-17 PROCEDURE — 88341 IMHCHEM/IMCYTCHM EA ADD ANTB: CPT

## 2021-11-17 PROCEDURE — 88341 IMHCHEM/IMCYTCHM EA ADD ANTB: CPT | Mod: 26,59

## 2021-11-17 PROCEDURE — 88360 TUMOR IMMUNOHISTOCHEM/MANUAL: CPT

## 2021-11-17 PROCEDURE — 88305 TISSUE EXAM BY PATHOLOGIST: CPT | Mod: 26

## 2021-11-17 PROCEDURE — 85027 COMPLETE CBC AUTOMATED: CPT

## 2021-11-17 PROCEDURE — 88360 TUMOR IMMUNOHISTOCHEM/MANUAL: CPT | Mod: 26

## 2021-11-17 PROCEDURE — 88307 TISSUE EXAM BY PATHOLOGIST: CPT

## 2021-11-17 PROCEDURE — 88305 TISSUE EXAM BY PATHOLOGIST: CPT

## 2021-11-17 PROCEDURE — 88307 TISSUE EXAM BY PATHOLOGIST: CPT | Mod: 26

## 2021-11-17 PROCEDURE — 82962 GLUCOSE BLOOD TEST: CPT

## 2021-11-17 RX ORDER — HYDROMORPHONE HYDROCHLORIDE 2 MG/ML
0.5 INJECTION INTRAMUSCULAR; INTRAVENOUS; SUBCUTANEOUS ONCE
Refills: 0 | Status: DISCONTINUED | OUTPATIENT
Start: 2021-11-17 | End: 2021-11-17

## 2021-11-17 RX ORDER — OXYCODONE AND ACETAMINOPHEN 5; 325 MG/1; MG/1
1 TABLET ORAL
Qty: 0 | Refills: 0 | DISCHARGE

## 2021-11-17 RX ORDER — ONDANSETRON 8 MG/1
4 TABLET, FILM COATED ORAL ONCE
Refills: 0 | Status: COMPLETED | OUTPATIENT
Start: 2021-11-17 | End: 2021-11-17

## 2021-11-17 RX ORDER — IBUPROFEN 200 MG
3 TABLET ORAL
Qty: 0 | Refills: 0 | DISCHARGE

## 2021-11-17 RX ORDER — METRONIDAZOLE 500 MG
500 TABLET ORAL ONCE
Refills: 0 | Status: COMPLETED | OUTPATIENT
Start: 2021-11-17 | End: 2021-11-17

## 2021-11-17 RX ORDER — SODIUM CHLORIDE 9 MG/ML
1000 INJECTION, SOLUTION INTRAVENOUS
Refills: 0 | Status: DISCONTINUED | OUTPATIENT
Start: 2021-11-17 | End: 2021-11-17

## 2021-11-17 RX ORDER — BUPIVACAINE 13.3 MG/ML
20 INJECTION, SUSPENSION, LIPOSOMAL INFILTRATION ONCE
Refills: 0 | Status: COMPLETED | OUTPATIENT
Start: 2021-11-17 | End: 2021-11-17

## 2021-11-17 RX ORDER — CEFAZOLIN SODIUM 1 G
2000 VIAL (EA) INJECTION ONCE
Refills: 0 | Status: COMPLETED | OUTPATIENT
Start: 2021-11-17 | End: 2021-11-17

## 2021-11-17 RX ORDER — LANOLIN ALCOHOL/MO/W.PET/CERES
1 CREAM (GRAM) TOPICAL
Qty: 0 | Refills: 0 | DISCHARGE

## 2021-11-17 RX ADMIN — ONDANSETRON 4 MILLIGRAM(S): 8 TABLET, FILM COATED ORAL at 17:09

## 2021-11-17 RX ADMIN — SODIUM CHLORIDE 75 MILLILITER(S): 9 INJECTION, SOLUTION INTRAVENOUS at 09:16

## 2021-11-17 NOTE — ASU DISCHARGE PLAN (ADULT/PEDIATRIC) - ACTIVITY LEVEL
x 2 weeks/No excercise/No heavy lifting/No sports/gym/Nothing per vagina/No tub baths/No douching/No tampons/No intercourse

## 2021-11-17 NOTE — BRIEF OPERATIVE NOTE - OPERATION/FINDINGS
Uterus 18-20 weeks with multiple myomas. Normal ovaries bilaterally. Uneventful laparoscopic subtotal hysterectomy and bilateral salpingectomy via left upper quadrant incision. EBL = 100 cc.

## 2021-11-17 NOTE — ASU DISCHARGE PLAN (ADULT/PEDIATRIC) - CARE PROVIDER_API CALL
Zoltan Smith)  Obstetrics and Gynecology  [address] [place] [address], [address]  [place] [address]  Phone: [phone]  Fax: [phone]  Follow Up Time:

## 2021-11-17 NOTE — BRIEF OPERATIVE NOTE - NSICDXBRIEFPROCEDURE_GEN_ALL_CORE_FT
PROCEDURES:  Hysterectomy, supracervical, laparoscopic, with salpingectomy or oophorectomy, for uterus greater than 250 grams 17-Nov-2021 14:33:31  Zoltan Smith

## 2021-12-03 LAB — SURGICAL PATHOLOGY STUDY: SIGNIFICANT CHANGE UP

## 2022-03-15 NOTE — ASU DISCHARGE PLAN (ADULT/PEDIATRIC) - NO DOUCHING DURATION
x 2 weeks Constitutional: (-) fever (-) malaise (-) diaphoresis (-) chills  Eyes: (-) visual changes (-) eye pain (-) eye discharge (-) photophobia (-) FB sensation  Cardiac: (-) chest pain  (-) palpitations (-) syncope (-) edema  Respiratory: (-) cough (-) SOB (-) ALBERTO  GI: (-) nausea (-) vomiting (-) diarrhea (+) abdominal pain   : (-) dysuria (-) increased frequency  (-) hematuria (-) incontinence  Neuro: (-) headache (-) dizziness (-) numbness/tingling to extremities B/L (-) weakness  Skin: (-) rash (-) laceration  GYN: (-) pelvic pain (-) abnormal bleeding (-) abnormal discharge or odor  Except as documented in the HPI, all other systems are negative.

## 2023-04-11 NOTE — ASU PATIENT PROFILE, ADULT - VISION (WITH CORRECTIVE LENSES IF THE PATIENT USUALLY WEARS THEM):
Ask Dr. Mcbride if you need stress test prior to surgery and if you can stop aspirin 325 mg one week prior to surgery.    For your diabetes --> ask dr. Mathew what to do with your pump     Labs today          
Normal vision: sees adequately in most situations; can see medication labels, newsprint

## 2024-03-20 NOTE — PROGRESS NOTE ADULT - PROVIDER SPECIALTY LIST ADULT
GYN
Patient will  script today.  Going to see OIO tomorrow   
GYN
Hospitalist
Infectious Disease
GYN
Hospitalist

## 2024-04-05 NOTE — DISCHARGE NOTE PROVIDER - DISCHARGE DATE
-- DO NOT REPLY / DO NOT REPLY ALL --  -- Message is from Engagement Center Operations ([facility]) --    General Patient Message: The patient spouse called in regarding medication being sent incorrectly medicare requires prescribed once daily NOT twice, please call with assistance.    Caller Information         Type Contact Phone/Fax    04/04/2024 03:39 PM CDT Phone (Incoming) HARSHAD ASHBYH (Emergency Contact) [phone]          Alternative phone number: [phone] after 4pm    Can a detailed message be left? Yes    Message Turnaround:               
Incoming call from Wife.  New medicare changes will only cover daily testing.  Script updated and sent.  
Return call to wife.  Left message most recent refill was for test strips BID, which pt has been doing for years. Will await for return call to amend medication issues.   
19-Aug-2021

## 2024-05-13 NOTE — PATIENT PROFILE ADULT - LEGAL HELP
CHIEF COMPLAINT     Chief Complaint   Patient presents with    Physical     Sees Gyne     HPI:   Lana Schwab is a 43 year old female who presents for a complete physical exam.     Diet is good. Exercise is stationary bike few times/weekly. Alcohol use is social. No smoking. She is  and has 2 boys (7 & 10 y/o). 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      Labs reviewed and discussed with patient. Vaccines reviewed. UTD with flu and tetanus vaccine. Due for covid booster. Sees gyne, pap and mammogram are UTD. Colonoscopy done 9/2023 due in 2033. Wears seatbelt. No texting and driving. No skin concerns.     Hypothyroidism: On levothyroxine since 2015. No SE and no symptoms of hyperthyroidism (weight loss, diarrhea, anxiety, palpitations) or hypothyroidism (weight gain, depression, bradycardia) at this time.     Wt Readings from Last 6 Encounters:   05/13/24 183 lb 6.4 oz (83.2 kg)   10/02/23 178 lb (80.7 kg)   09/21/23 178 lb 9.2 oz (81 kg)   08/07/23 180 lb (81.6 kg)   07/17/23 183 lb (83 kg)   06/05/23 186 lb (84.4 kg)     Body mass index is 28.22 kg/m².     Cholesterol, Total (mg/dL)   Date Value   03/19/2024 187   04/17/2023 174     HDL Cholesterol (mg/dL)   Date Value   03/19/2024 81 (H)   04/17/2023 85 (H)     LDL Cholesterol (mg/dL)   Date Value   03/19/2024 91   04/17/2023 71     AST (U/L)   Date Value   03/19/2024 18   04/17/2023 19     ALT (U/L)   Date Value   03/19/2024 28   04/17/2023 26        Current Outpatient Medications   Medication Sig Dispense Refill    levothyroxine 88 MCG Oral Tab Take 1 tablet (88 mcg total) by mouth before breakfast. 90 tablet 3    Magnesium 400 MG Oral Cap Take 1 Capful by mouth daily.      CHOLECALCIFEROL 25 MCG (1000 UT) Oral Tab Take 1 tablet (1,000 Units total) by mouth daily.        No Known Allergies   Past Medical History:    Abdominal distention    diagnosed IBS    Abdominal pain    diagnosed IBS    Acquired hypothyroidism    Adrenal cyst  (HCC)    Increased from 2.1 cm to 4.2 cm. Biochemical work up for cushings' syndrome, pheochromocytoma and aldosterone secreting adrenal adenoma where all normal. MR Abdomen (3/11/21): Since 2010, mild increase in size of a benign 4.2-cm left adrenal cyst. Gen Surg   A repeat survallence abdominal MRI should be done in about 4-5 years. She does not need repeat adrenal hormones at that time.    ASCUS of cervix with negative high risk HPV    Blood in the stool    likely related to hemorrhoids    Dense breasts    Disorder of thyroid    Elevated fasting glucose    Esophageal reflux    Fatigue    on and off    Fibroids    As of 2020 7 cm fundal fibroid    Headache disorder    migraines    Heartburn    mild, occasional    Heavy menses    occasionall, related fibroid    Hemorrhoids    Hypothyroidism    IBS (irritable bowel syndrome)    Indigestion    Iron deficiency anemia    Migraine without aura    nausea, light sensitivity. No vision changes.    Migraines    Pain with bowel movements    Pap smear for cervical cancer screening    Pap & HPV negative    Serous cystadenoma of left ovary    cyst removed laparoscopically    Status post hysteroscopic polypectomy    benign endometrial polyp    Stress    Visual impairment    Wears glasses    contacts and glasses      Past Surgical History:   Procedure Laterality Date                Colonoscopy  2017    Was having a lot of issues. May have had polyps. Doesn't remember if was told when to come back    Colonoscopy  2010    IBS symptoms. Thinks there were polyps    Colonoscopy  2023    No polyps. Biopsy negative. Done for abdominal pain, bloating, IBS-D, hematochezia    Egd  2010    Egd  2023    gastritis. biopsies negative. Done for: abdominal pain, bloating, IBS-D, hematochezia    Hc endometrial sampling w or wo endocerv sampling  2023    EMB. Cervix very anterior. Sounded 9 cm. Path: Polypoid fragments of  probable necrotic endometrial polyp.Dr. Caro Bryan    Hysteroscopy,with sampling  09/21/2023    Hysteroscopy, D&C, sampling of isthmocele cavity tissue. Path: Benign endometrial polyp.Dr. Caro Bryan, [facility] screening bilat (csm=[id number])  11/08/2022    No significant change.  Stable benign asymmetry in left breast. Heterogeneously dense    Reduction of large breast Bilateral 2004    Removal of ovarian cyst(s) Left 12/14/2017    LAPAROSCOPIC OVARIAN CYST REMOVAL; LEFT (serous cystadenofibroma) As-Elodia Nash MD; [place]    Surg rx missed abortn,1st tri  12/04/2020    Manual vacuum aspiration under ultrasound guidance for 7w6d missed AB (known large fundal fibroid 7 cm & lack of clear gestational sac). Dariela Hartley MD. [facility] provided the [place] guidance - note indicates worrisome for CS scar implantation      Family History   Problem Relation Age of Onset    Osteoporosis Mother         middle age    High Cholesterol Father     Heart Disease Father     Heart Attack Father     Diabetes Father     Hypertension Father     COPD Father         smoker    Thyroid disease Maternal Grandmother     Kidney Cancer Paternal Grandmother     Stroke Paternal Grandfather     Heart Disease Paternal Grandfather     Other (autism) Son     ADHD Son     Crohn's Disease Paternal Aunt     Breast Cancer Neg     Ovarian Cancer Neg     Colon Cancer Neg     Uterine Cancer Neg       Social History:   Social History     Socioeconomic History    Marital status:     Number of children: 2   Occupational History     Employer: [employer]   Tobacco Use    Smoking status: Never     Passive exposure: Never    Smokeless tobacco: Never   Vaping Use    Vaping status: Never Used   Substance and Sexual Activity    Alcohol use: Yes     Alcohol/week: 0.0 - 2.0 standard drinks of alcohol     Comment: socially    Drug use: Never    Sexual activity: Yes     Partners: Male     Birth control/protection: Condom    Other Topics Concern    Caffeine Concern No    Exercise Yes    Seat Belt Yes    Special Diet No    Stress Concern No    Weight Concern No    Occupational Exposure No    Hobby Hazards No    Sleep Concern No    Back Care Yes    Bike Helmet Yes    Self-Exams Yes        REVIEW OF SYSTEMS:   GENERAL: feels well otherwise  SKIN: denies any unusual skin lesions  EYES: denies blurred vision or double vision  HEENT: denies nasal congestion, sinus pain or ST  LUNGS: denies shortness of breath with exertion  CARDIOVASCULAR: denies chest pain on exertion  GI: denies abdominal pain, denies heartburn  : denies vaginal discharge or dysuria  MUSCULOSKELETAL: denies back pain  NEURO: denies headaches  PSYCH: denies depression or anxiety  HEMATOLOGIC: denies hx of anemia  ENDOCRINE: hypothyroidism+  ALL/ASTHMA: denies hx of allergy or asthma    EXAM:   /68 (BP Location: Left arm, Patient Position: Sitting, Cuff Size: adult)   Pulse 61   Temp 97.9 °F (36.6 °C) (Temporal)   Resp 16   Ht 5' 7.6\" (1.717 m)   Wt 183 lb 6.4 oz (83.2 kg)   LMP 04/27/2024 (Exact Date)   SpO2 98%   BMI 28.22 kg/m²   Body mass index is 28.22 kg/m².   GENERAL: well developed, well nourished, in no apparent distress  SKIN: no rashes, no suspicious lesions  HEENT: atraumatic, normocephalic, ears and throat are clear  EYES: PERRLA, EOMI, conjunctiva are clear  NECK: supple, no adenopathy, no bruits, no thyroid masses  BREAST: DEFERRED TO GYNE  LUNGS: clear to auscultation, no rhonchi, rales, or wheezing  CARDIO: RRR without murmur  GI: good BS's, no masses, HSM or tenderness  : DEFERRED TO GYNE   MUSCULOSKELETAL: No obvious joint deformity or swelling. Normal gait  EXTREMITIES: no cyanosis, clubbing or edema  NEURO: oriented times three, cranial nerves are grossly intact, motor and sensory are grossly intact    LABS:     Lab Results   Component Value Date    WBC 7.6 03/19/2024    RBC 4.29 03/19/2024    HGB 13.7 03/19/2024    HCT 42.5 03/19/2024     MCV 99.1 03/19/2024    MCH 31.9 03/19/2024    MCHC 32.2 03/19/2024    RDW 11.9 03/19/2024    .0 03/19/2024      Lab Results   Component Value Date     (H) 03/19/2024    BUN 12 03/19/2024    CREATSERUM 0.99 03/19/2024    ANIONGAP 3 03/19/2024    CA 8.8 03/19/2024    OSMOCALC 284 03/19/2024    ALKPHO 46 03/19/2024    AST 18 03/19/2024    ALT 28 03/19/2024    BILT 0.3 03/19/2024    TP 7.0 03/19/2024    ALB 3.9 03/19/2024    GLOBULIN 3.1 03/19/2024     03/19/2024    K 4.3 03/19/2024     03/19/2024    CO2 26.0 03/19/2024      Lab Results   Component Value Date    CHOLEST 187 03/19/2024    TRIG 83 03/19/2024    HDL 81 (H) 03/19/2024    LDL 91 03/19/2024    VLDL 13 03/19/2024    NONHDLC 106 03/19/2024      Lab Results   Component Value Date    TSH 0.541 03/19/2024      Lab Results   Component Value Date     03/19/2024    A1C 5.5 03/19/2024       IMAGING:     No results found.     ASSESSMENT AND PLAN:     1. Encounter for annual physical exam  Lana Schwab is a 43 year old female who presents for a complete physical exam. Sees gyne, pap is UTD. Breast and pelvic deferred to gyne per patient request. Reviewed diet and exercise. Pt' s weight is Body mass index is 28.22 kg/m².. Recommended regular exercise. The patient indicates understanding of these issues and agrees to the plan.  -Mammogram UTD, due in November  -UTD with labs  -UTD with tetanus  -Advised to get covid booster at pharmacy if no updated one since fall     2. Acquired hypothyroidism  -Euthyroid clinically and chemically  -Continue levothyroxine  - levothyroxine 88 MCG Oral Tab; Take 1 tablet (88 mcg total) by mouth before breakfast.  Dispense: 90 tablet; Refill: 3     Return in about 1 year (around 5/13/2025) for physical.    Martha Frost, APRN  5/13/2024   no

## 2025-07-24 NOTE — H&P PST ADULT - VENOUS THROMBOEMBOLISM CURRENT LABS/TEST RESULTS
Date of Service:  07/24/2025    Kyle is a 61-year-old man whom I have seen previously, here for skin cancer check.  Not aware of any particular concerning lesions.  No lesion has changed or become symptomatic.    PHYSICAL EXAMINATION:  Today, fair complexioned middle-aged man with keratotic erythematous papule involving the right temple along the hairline.  The rest of the examination including the scalp, face, ears, neck, upper chest, abdomen, back, upper and lower extremities including hands, feet, nails, hips, buttocks, and eyelids showed no concerning lesions for malignancy.  The examination of scalp was limited because of the hair density.    DIAGNOSIS:  Actinic keratosis involving the right temple.  Discussed the diagnosis with the patient.  Recommended liquid nitrogen treatment.  The procedure explained including painful blister.  After that, we carried out, 1 lesion was treated today.    Continue sun protection including sunscreen use, self-examination, and every other year checkup, earlier if any concerns arise.        Dictated By:  Sarai Wood MD  Signing Provider:  Sarai Wood MD    TVN/AQT  D:  07/24/2025 09:58:05 AM  T:  07/24/2025 10:24:35 AM  Job:  [id number]        none